# Patient Record
Sex: FEMALE | Race: WHITE
[De-identification: names, ages, dates, MRNs, and addresses within clinical notes are randomized per-mention and may not be internally consistent; named-entity substitution may affect disease eponyms.]

---

## 2021-11-13 ENCOUNTER — HOSPITAL ENCOUNTER (OUTPATIENT)
Dept: HOSPITAL 47 - EC | Age: 31
Setting detail: OBSERVATION
LOS: 2 days | Discharge: HOME | End: 2021-11-15
Attending: OBSTETRICS & GYNECOLOGY | Admitting: OBSTETRICS & GYNECOLOGY
Payer: COMMERCIAL

## 2021-11-13 DIAGNOSIS — K21.9: ICD-10-CM

## 2021-11-13 DIAGNOSIS — Z87.11: ICD-10-CM

## 2021-11-13 DIAGNOSIS — E66.01: ICD-10-CM

## 2021-11-13 DIAGNOSIS — Z98.84: ICD-10-CM

## 2021-11-13 DIAGNOSIS — R79.89: ICD-10-CM

## 2021-11-13 DIAGNOSIS — Z88.1: ICD-10-CM

## 2021-11-13 DIAGNOSIS — Z98.890: ICD-10-CM

## 2021-11-13 DIAGNOSIS — Z88.5: ICD-10-CM

## 2021-11-13 DIAGNOSIS — K66.1: ICD-10-CM

## 2021-11-13 DIAGNOSIS — O00.102: Primary | ICD-10-CM

## 2021-11-13 DIAGNOSIS — Z90.49: ICD-10-CM

## 2021-11-13 LAB
ALBUMIN SERPL-MCNC: 3.9 G/DL (ref 3.5–5)
ALP SERPL-CCNC: 50 U/L (ref 38–126)
ALT SERPL-CCNC: 33 U/L (ref 4–34)
AMYLASE SERPL-CCNC: 55 U/L (ref 30–110)
ANION GAP SERPL CALC-SCNC: 8 MMOL/L
APTT BLD: 22.5 SEC (ref 22–30)
AST SERPL-CCNC: 30 U/L (ref 14–36)
BASOPHILS # BLD AUTO: 0 K/UL (ref 0–0.2)
BASOPHILS # BLD AUTO: 0.1 K/UL (ref 0–0.2)
BASOPHILS NFR BLD AUTO: 0 %
BASOPHILS NFR BLD AUTO: 0 %
BUN SERPL-SCNC: 10 MG/DL (ref 7–17)
CALCIUM SPEC-MCNC: 9.7 MG/DL (ref 8.4–10.2)
CHLORIDE SERPL-SCNC: 104 MMOL/L (ref 98–107)
CO2 SERPL-SCNC: 24 MMOL/L (ref 22–30)
EOSINOPHIL # BLD AUTO: 0 K/UL (ref 0–0.7)
EOSINOPHIL # BLD AUTO: 0.2 K/UL (ref 0–0.7)
EOSINOPHIL NFR BLD AUTO: 0 %
EOSINOPHIL NFR BLD AUTO: 1 %
ERYTHROCYTE [DISTWIDTH] IN BLOOD BY AUTOMATED COUNT: 4.37 M/UL (ref 3.8–5.4)
ERYTHROCYTE [DISTWIDTH] IN BLOOD BY AUTOMATED COUNT: 4.71 M/UL (ref 3.8–5.4)
ERYTHROCYTE [DISTWIDTH] IN BLOOD: 13.1 % (ref 11.5–15.5)
ERYTHROCYTE [DISTWIDTH] IN BLOOD: 13.8 % (ref 11.5–15.5)
GLUCOSE SERPL-MCNC: 107 MG/DL (ref 74–99)
HCT VFR BLD AUTO: 37.5 % (ref 34–46)
HCT VFR BLD AUTO: 40.8 % (ref 34–46)
HGB BLD-MCNC: 12.6 GM/DL (ref 11.4–16)
HGB BLD-MCNC: 13.4 GM/DL (ref 11.4–16)
INR PPP: 0.9 (ref ?–1.2)
LIPASE SERPL-CCNC: 135 U/L (ref 23–300)
LYMPHOCYTES # SPEC AUTO: 2.6 K/UL (ref 1–4.8)
LYMPHOCYTES # SPEC AUTO: 2.8 K/UL (ref 1–4.8)
LYMPHOCYTES NFR SPEC AUTO: 17 %
LYMPHOCYTES NFR SPEC AUTO: 27 %
MCH RBC QN AUTO: 28.6 PG (ref 25–35)
MCH RBC QN AUTO: 28.7 PG (ref 25–35)
MCHC RBC AUTO-ENTMCNC: 33 G/DL (ref 31–37)
MCHC RBC AUTO-ENTMCNC: 33.5 G/DL (ref 31–37)
MCV RBC AUTO: 85.6 FL (ref 80–100)
MCV RBC AUTO: 86.6 FL (ref 80–100)
MONOCYTES # BLD AUTO: 0.5 K/UL (ref 0–1)
MONOCYTES # BLD AUTO: 0.5 K/UL (ref 0–1)
MONOCYTES NFR BLD AUTO: 3 %
MONOCYTES NFR BLD AUTO: 4 %
NEUTROPHILS # BLD AUTO: 11.9 K/UL (ref 1.3–7.7)
NEUTROPHILS # BLD AUTO: 7 K/UL (ref 1.3–7.7)
NEUTROPHILS NFR BLD AUTO: 66 %
NEUTROPHILS NFR BLD AUTO: 78 %
PH UR: 6.5 [PH] (ref 5–8)
PLATELET # BLD AUTO: 334 K/UL (ref 150–450)
PLATELET # BLD AUTO: 381 K/UL (ref 150–450)
POTASSIUM SERPL-SCNC: 4.2 MMOL/L (ref 3.5–5.1)
PROT SERPL-MCNC: 6.9 G/DL (ref 6.3–8.2)
PT BLD: 10 SEC (ref 9–12)
SODIUM SERPL-SCNC: 136 MMOL/L (ref 137–145)
SP GR UR: 1.03 (ref 1–1.03)
UROBILINOGEN UR QL STRIP: <2 MG/DL (ref ?–2)
WBC # BLD AUTO: 10.6 K/UL (ref 3.8–10.6)
WBC # BLD AUTO: 15.3 K/UL (ref 3.8–10.6)

## 2021-11-13 PROCEDURE — 84484 ASSAY OF TROPONIN QUANT: CPT

## 2021-11-13 PROCEDURE — 86850 RBC ANTIBODY SCREEN: CPT

## 2021-11-13 PROCEDURE — 93005 ELECTROCARDIOGRAM TRACING: CPT

## 2021-11-13 PROCEDURE — 96361 HYDRATE IV INFUSION ADD-ON: CPT

## 2021-11-13 PROCEDURE — 96376 TX/PRO/DX INJ SAME DRUG ADON: CPT

## 2021-11-13 PROCEDURE — 99285 EMERGENCY DEPT VISIT HI MDM: CPT

## 2021-11-13 PROCEDURE — 88305 TISSUE EXAM BY PATHOLOGIST: CPT

## 2021-11-13 PROCEDURE — 74018 RADEX ABDOMEN 1 VIEW: CPT

## 2021-11-13 PROCEDURE — 86900 BLOOD TYPING SEROLOGIC ABO: CPT

## 2021-11-13 PROCEDURE — 85610 PROTHROMBIN TIME: CPT

## 2021-11-13 PROCEDURE — 81025 URINE PREGNANCY TEST: CPT

## 2021-11-13 PROCEDURE — 96375 TX/PRO/DX INJ NEW DRUG ADDON: CPT

## 2021-11-13 PROCEDURE — 96374 THER/PROPH/DIAG INJ IV PUSH: CPT

## 2021-11-13 PROCEDURE — 86901 BLOOD TYPING SEROLOGIC RH(D): CPT

## 2021-11-13 PROCEDURE — 36415 COLL VENOUS BLD VENIPUNCTURE: CPT

## 2021-11-13 PROCEDURE — 76817 TRANSVAGINAL US OBSTETRIC: CPT

## 2021-11-13 PROCEDURE — 84702 CHORIONIC GONADOTROPIN TEST: CPT

## 2021-11-13 PROCEDURE — 59151 TREAT ECTOPIC PREGNANCY: CPT

## 2021-11-13 PROCEDURE — 83690 ASSAY OF LIPASE: CPT

## 2021-11-13 PROCEDURE — 85025 COMPLETE CBC W/AUTO DIFF WBC: CPT

## 2021-11-13 PROCEDURE — 85379 FIBRIN DEGRADATION QUANT: CPT

## 2021-11-13 PROCEDURE — 76801 OB US < 14 WKS SINGLE FETUS: CPT

## 2021-11-13 PROCEDURE — 80053 COMPREHEN METABOLIC PANEL: CPT

## 2021-11-13 PROCEDURE — 71045 X-RAY EXAM CHEST 1 VIEW: CPT

## 2021-11-13 PROCEDURE — 85730 THROMBOPLASTIN TIME PARTIAL: CPT

## 2021-11-13 PROCEDURE — 81003 URINALYSIS AUTO W/O SCOPE: CPT

## 2021-11-13 PROCEDURE — 82150 ASSAY OF AMYLASE: CPT

## 2021-11-13 RX ADMIN — POTASSIUM CHLORIDE SCH MLS/HR: 14.9 INJECTION, SOLUTION INTRAVENOUS at 23:53

## 2021-11-13 NOTE — US
EXAMINATION TYPE: Transabdominal and TV US

 

DATE OF EXAM: 2021 6:35 PM

 

COMPARISON: NONE

 

CLINICAL HISTORY: diffuse lower abdominal pain, sudden onset. EC patient with RUQ pain now; gallbladd
er already removed; prior abdominal pain today; did not know she was pregnant (urine detected); 

 

EXAM PERFORMED:  Transvaginal (TV) and Transabdominal (TA)

 

EXAM MEASUREMENTS:

 

GESTATIONAL AGE / DATING

 

Physician Established: Not yet established 

Dates by LMP: (2 weeks/4 days)  

** EDC: 2022

Dates by First Scan:  No previous 

Dates by Current Scan for: no IUP or ectopic is seen today and may be limited by patient's very large
 body habitus

 

MATERNAL ANATOMY 

 

Uterus: 7.4 x 5.2 x 4.1cm; endometrial thickness = 0.7cm 

Right Ovary: 2.3 x 2.7 x 2.5cm

Left Ovary: not seen TA or TV US

Post CDS / Adnexa: moderate amount of free fluid seen in posterior cul de sac =1.3 x 2.8 x 1.2 x 0.52
3 = 2.3ml. 

Presence of corpus luteal cyst: not seen

 

GESTATION / FETAL SURVEY: no IUP or ectopic pregnancy is seen; left ovary is not seen.

 

 

Date of LMP: 10/26/41394

Beta HcG (if available):  urine detected

 

 

 

 

 

IMPRESSION:

The uterus is empty. No adnexal mass. There is some free fluid in the cul-de-sac measures 1.3 cm in t
hickness. There are cervical cysts noted.

## 2021-11-13 NOTE — XR
EXAMINATION TYPE: XR chest 1V portable

 

DATE OF EXAM: 11/13/2021

 

COMPARISON: NONE

 

HISTORY: Right sided chest pain

 

TECHNIQUE: 2 views portable

 

FINDINGS: Heart and mediastinum are normal. Lungs are clear. Diaphragm is normal. Bony thorax is inta
ct.

 

IMPRESSION: Normal chest.

## 2021-11-13 NOTE — ED
General Adult HPI





<Steffen Michael - Last Filed: 11/13/21 20:18>





- General


Source: patient, RN notes reviewed


Mode of arrival: wheelchair


Limitations: no limitations





<Danna Torres - Last Filed: 11/14/21 10:10>





- General


Chief complaint: Abdominal Pain


Stated complaint: Abd pain


Time Seen by Provider: 11/13/21 13:23





- History of Present Illness


Initial comments: 





31-year-old female presents to the emergency department, accompanied by her 

spouse, for evaluation of generalized lower abdominal pain, onset noon today.  

Patient states the pain came on unprovoked and is accompanied by mild nausea.  

Reports worsening pain with movement, laughing, and any activity.  Patient 

describes discomfort as severe cramping.  States she has had a bowel movement 

today, denies diarrhea or constipation.  States she is also experiencing 

increased pressure in the lower abdomen with urination.  Patient reports LMP 

10/30/2021.  Patient denies fever, chills, headache, chest pain, difficulty 

breathing, and shortness of breath. (Danna Torres)





- Related Data


                                Home Medications











 Medication  Instructions  Recorded  Confirmed


 


No Known Home Medications  11/13/21 11/13/21











                                    Allergies











Allergy/AdvReac Type Severity Reaction Status Date / Time


 


codeine Allergy  Unknown Verified 11/13/21 14:35


 


hydromorphone [From Dilaudid] Allergy  Chest Pain Verified 11/13/21 14:35


 


nitrofurantoin Allergy  Anaphylaxis Verified 11/13/21 14:35





[From Macrobid]     














Review of Systems


ROS Other: All systems not noted in ROS Statement are negative.





<Steffen Michael - Last Filed: 11/13/21 20:18>


ROS Other: All systems not noted in ROS Statement are negative.





<Danna Torres - Last Filed: 11/14/21 10:10>


ROS Statement: 


Those systems with pertinent positive or pertinent negative responses have been 

documented in the HPI.








Past Medical History





- Past Family History


  ** Mother


Family Medical History: No Reported History





<Danna Torres - Last Filed: 11/14/21 10:10>





General Exam


Limitations: no limitations (Well-developed, well-nourished female in no acute 

distress.  Initial temperature 97.7, pulse 83, respirations 20, blood pressure 

135/93, pulse ox 100% on room air.)


General appearance: alert, in no apparent distress


ENT exam: Present: normal exam, normal oropharynx, mucous membranes moist


Respiratory exam: Present: normal lung sounds bilaterally.  Absent: respiratory 

distress, wheezes, rales, rhonchi, stridor


Cardiovascular Exam: Present: regular rate, normal rhythm, normal heart sounds. 

Absent: systolic murmur, diastolic murmur, rubs, gallop, clicks


GI/Abdominal exam: Present: soft, tenderness (Diffuse lower abdominal tenderness

unchanged with palpation), normal bowel sounds.  Absent: distended, guarding, 

rebound, rigid


Back exam: Present: normal inspection.  Absent: CVA tenderness (R), CVA tende

rness (L)


Neurological exam: Present: alert, oriented X3, CN II-XII intact


Psychiatric exam: Present: normal mood


Skin exam: Present: warm, dry, intact, normal color.  Absent: rash





<Danna Torres - Last Filed: 11/14/21 10:10>





Course





<Danna Torres - Last Filed: 11/14/21 10:10>


                                   Vital Signs











  11/13/21 11/13/21 11/13/21





  12:24 17:16 21:48


 


Temperature 97.7 F  


 


Pulse Rate 83 95 90


 


Respiratory 20 18 18





Rate   


 


Blood Pressure 135/93 108/78 


 


O2 Sat by Pulse 100 98 98





Oximetry   














- Reevaluation(s)


Reevaluation #1: 





11/13/21 16:47


Entered patient's room to update her on positive pregnancy, and found her dry 

heaving.  Informed her that the CT would be canceled and that additional blood 

work would be required.  Zofran ordered for nausea.  Patient agreeable to plan 

of care.


Patient states last menstrual period was 3 weeks ago and was one week late.  

Denies any vaginal bleeding or discharge currently.


11/13/21 19:30


Findings discussed with Dr. Michael who will assume patient care at this time.


 (Danna Torres)





Medical Decision Making





- Lab Data


Result diagrams: 


                                 11/13/21 12:31





                                 11/13/21 12:31





<Steffen Michael - Last Filed: 11/13/21 20:18>





- Lab Data


Result diagrams: 


                                 11/14/21 05:52





                                 11/13/21 12:31





<Danna Torres - Last Filed: 11/14/21 10:10>





- Medical Decision Making


31-year-old female with abdominal pain.  Pain initially began as bilateral lower

abdominal pain.  Patient initially denied chance of pregnancy stating her last 

menstrual cycle was approximately 2 weeks ago.  Patient was found to be pregnant

with a beta hCG of 3100.  Ultrasound was performed which was performed both 

transabdominally and transvaginally.  The left ovary was not visualized.  There 

was some free fluid within the pelvis.  Initial hemoglobin was 13.4.  The vital 

signs are stable.  At the time of my initial evaluation patient had no abdominal

tenderness but did complain of pain in the right upper quadrant.  Stating that 

her pelvic pain and lower abdominal pain had resolved.  I did discuss case with 

Dr. Pancho peacock for OB/GYN, she agrees to admit the patient for observation.  

Repeat hemoglobin will be obtained now and at 6 AM as well as a repeat beta hCG 

in the morning.  Pain medication will be available.  The patient will be kept 

nothing by mouth. (Steffen Michael)





31-year-old female presents to the emergency Department with complaints of 

sudden onset lower abdominal pain around noon today.  Upon exam, patient is 

well-appearing, but reports persistent discomfort and intermittent nausea.  

Denied vaginal bleeding or chance of pregnancy stating her LMP 10/30/2021. 

However, upon review of lab work, patient's urine hCG is positive therefore a 

beta was ordered and hCG is 3154.  Ultrasound was obtained and shows no 

intrauterine pregnancy or ectopic, however there is fluid noted in the 

cul-de-sac.  Patient's pain has localized to underneath the right breast/ right 

upper quadrant and abdomen remains nontender to palpation.  Pain and nausea 

medication were given with minimal improvement therefore doses were repeated. 

Patient care was discussed with Dr. Michael who agrees to assume care of this 

patient at this time. (Danna Torres)





- Lab Data


                                   Lab Results











  11/13/21 11/13/21 11/13/21 Range/Units





  12:31 12:31 12:31 


 


WBC  10.6    (3.8-10.6)  k/uL


 


RBC  4.71    (3.80-5.40)  m/uL


 


Hgb  13.4    (11.4-16.0)  gm/dL


 


Hct  40.8    (34.0-46.0)  %


 


MCV  86.6    (80.0-100.0)  fL


 


MCH  28.6    (25.0-35.0)  pg


 


MCHC  33.0    (31.0-37.0)  g/dL


 


RDW  13.1    (11.5-15.5)  %


 


Plt Count  334    (150-450)  k/uL


 


MPV  7.2    


 


Neutrophils %  66    %


 


Lymphocytes %  27    %


 


Monocytes %  4    %


 


Eosinophils %  1    %


 


Basophils %  0    %


 


Neutrophils #  7.0    (1.3-7.7)  k/uL


 


Lymphocytes #  2.8    (1.0-4.8)  k/uL


 


Monocytes #  0.5    (0-1.0)  k/uL


 


Eosinophils #  0.2    (0-0.7)  k/uL


 


Basophils #  0.0    (0-0.2)  k/uL


 


PT     (9.0-12.0)  sec


 


INR     (<1.2)  


 


APTT     (22.0-30.0)  sec


 


Sodium    136 L  (137-145)  mmol/L


 


Potassium    4.2  (3.5-5.1)  mmol/L


 


Chloride    104  ()  mmol/L


 


Carbon Dioxide    24  (22-30)  mmol/L


 


Anion Gap    8  mmol/L


 


BUN    10  (7-17)  mg/dL


 


Creatinine    0.81  (0.52-1.04)  mg/dL


 


Est GFR (CKD-EPI)AfAm    >90  (>60 ml/min/1.73 sqM)  


 


Est GFR (CKD-EPI)NonAf    >90  (>60 ml/min/1.73 sqM)  


 


Glucose    107 H  (74-99)  mg/dL


 


Calcium    9.7  (8.4-10.2)  mg/dL


 


Total Bilirubin    0.3  (0.2-1.3)  mg/dL


 


AST    30  (14-36)  U/L


 


ALT    33  (4-34)  U/L


 


Alkaline Phosphatase    50  ()  U/L


 


Total Protein    6.9  (6.3-8.2)  g/dL


 


Albumin    3.9  (3.5-5.0)  g/dL


 


Amylase    55  ()  U/L


 


Lipase    135  ()  U/L


 


HCG, Quant     mIU/mL


 


Urine Color   Yellow   


 


Urine Appearance   Clear   (Clear)  


 


Urine pH   6.5   (5.0-8.0)  


 


Ur Specific Gravity   1.026   (1.001-1.035)  


 


Urine Protein   Negative   (Negative)  


 


Urine Glucose (UA)   Negative   (Negative)  


 


Urine Ketones   Negative   (Negative)  


 


Urine Blood   Negative   (Negative)  


 


Urine Nitrite   Negative   (Negative)  


 


Urine Bilirubin   Negative   (Negative)  


 


Urine Urobilinogen   <2.0   (<2.0)  mg/dL


 


Ur Leukocyte Esterase   Negative   (Negative)  


 


Urine HCG, Qual     (Not Detectd)  


 


Blood Type     


 


Blood Type Confirm     


 


Blood Type Recheck     


 


Bld Type Recheck Status     


 


Antibody Screen     


 


Spec Expiration Date     














  11/13/21 11/13/21 11/13/21 Range/Units





  12:31 12:31 20:02 


 


WBC     (3.8-10.6)  k/uL


 


RBC     (3.80-5.40)  m/uL


 


Hgb     (11.4-16.0)  gm/dL


 


Hct     (34.0-46.0)  %


 


MCV     (80.0-100.0)  fL


 


MCH     (25.0-35.0)  pg


 


MCHC     (31.0-37.0)  g/dL


 


RDW     (11.5-15.5)  %


 


Plt Count     (150-450)  k/uL


 


MPV     


 


Neutrophils %     %


 


Lymphocytes %     %


 


Monocytes %     %


 


Eosinophils %     %


 


Basophils %     %


 


Neutrophils #     (1.3-7.7)  k/uL


 


Lymphocytes #     (1.0-4.8)  k/uL


 


Monocytes #     (0-1.0)  k/uL


 


Eosinophils #     (0-0.7)  k/uL


 


Basophils #     (0-0.2)  k/uL


 


PT     (9.0-12.0)  sec


 


INR     (<1.2)  


 


APTT     (22.0-30.0)  sec


 


Sodium     (137-145)  mmol/L


 


Potassium     (3.5-5.1)  mmol/L


 


Chloride     ()  mmol/L


 


Carbon Dioxide     (22-30)  mmol/L


 


Anion Gap     mmol/L


 


BUN     (7-17)  mg/dL


 


Creatinine     (0.52-1.04)  mg/dL


 


Est GFR (CKD-EPI)AfAm     (>60 ml/min/1.73 sqM)  


 


Est GFR (CKD-EPI)NonAf     (>60 ml/min/1.73 sqM)  


 


Glucose     (74-99)  mg/dL


 


Calcium     (8.4-10.2)  mg/dL


 


Total Bilirubin     (0.2-1.3)  mg/dL


 


AST     (14-36)  U/L


 


ALT     (4-34)  U/L


 


Alkaline Phosphatase     ()  U/L


 


Total Protein     (6.3-8.2)  g/dL


 


Albumin     (3.5-5.0)  g/dL


 


Amylase     ()  U/L


 


Lipase     ()  U/L


 


HCG, Quant   3164.3   mIU/mL


 


Urine Color     


 


Urine Appearance     (Clear)  


 


Urine pH     (5.0-8.0)  


 


Ur Specific Gravity     (1.001-1.035)  


 


Urine Protein     (Negative)  


 


Urine Glucose (UA)     (Negative)  


 


Urine Ketones     (Negative)  


 


Urine Blood     (Negative)  


 


Urine Nitrite     (Negative)  


 


Urine Bilirubin     (Negative)  


 


Urine Urobilinogen     (<2.0)  mg/dL


 


Ur Leukocyte Esterase     (Negative)  


 


Urine HCG, Qual  Detected    (Not Detectd)  


 


Blood Type     


 


Blood Type Confirm    B Positive  


 


Blood Type Recheck     


 


Bld Type Recheck Status     


 


Antibody Screen     


 


Spec Expiration Date     














  11/13/21 11/13/21 11/13/21 Range/Units





  20:11 20:11 20:11 


 


WBC    15.3 H  (3.8-10.6)  k/uL


 


RBC    4.37  (3.80-5.40)  m/uL


 


Hgb    12.6  (11.4-16.0)  gm/dL


 


Hct    37.5  (34.0-46.0)  %


 


MCV    85.6  (80.0-100.0)  fL


 


MCH    28.7  (25.0-35.0)  pg


 


MCHC    33.5  (31.0-37.0)  g/dL


 


RDW    13.8  (11.5-15.5)  %


 


Plt Count    381  (150-450)  k/uL


 


MPV    7.2  


 


Neutrophils %    78  %


 


Lymphocytes %    17  %


 


Monocytes %    3  %


 


Eosinophils %    0  %


 


Basophils %    0  %


 


Neutrophils #    11.9 H  (1.3-7.7)  k/uL


 


Lymphocytes #    2.6  (1.0-4.8)  k/uL


 


Monocytes #    0.5  (0-1.0)  k/uL


 


Eosinophils #    0.0  (0-0.7)  k/uL


 


Basophils #    0.1  (0-0.2)  k/uL


 


PT   10.0   (9.0-12.0)  sec


 


INR   0.9   (<1.2)  


 


APTT   22.5   (22.0-30.0)  sec


 


Sodium     (137-145)  mmol/L


 


Potassium     (3.5-5.1)  mmol/L


 


Chloride     ()  mmol/L


 


Carbon Dioxide     (22-30)  mmol/L


 


Anion Gap     mmol/L


 


BUN     (7-17)  mg/dL


 


Creatinine     (0.52-1.04)  mg/dL


 


Est GFR (CKD-EPI)AfAm     (>60 ml/min/1.73 sqM)  


 


Est GFR (CKD-EPI)NonAf     (>60 ml/min/1.73 sqM)  


 


Glucose     (74-99)  mg/dL


 


Calcium     (8.4-10.2)  mg/dL


 


Total Bilirubin     (0.2-1.3)  mg/dL


 


AST     (14-36)  U/L


 


ALT     (4-34)  U/L


 


Alkaline Phosphatase     ()  U/L


 


Total Protein     (6.3-8.2)  g/dL


 


Albumin     (3.5-5.0)  g/dL


 


Amylase     ()  U/L


 


Lipase     ()  U/L


 


HCG, Quant     mIU/mL


 


Urine Color     


 


Urine Appearance     (Clear)  


 


Urine pH     (5.0-8.0)  


 


Ur Specific Gravity     (1.001-1.035)  


 


Urine Protein     (Negative)  


 


Urine Glucose (UA)     (Negative)  


 


Urine Ketones     (Negative)  


 


Urine Blood     (Negative)  


 


Urine Nitrite     (Negative)  


 


Urine Bilirubin     (Negative)  


 


Urine Urobilinogen     (<2.0)  mg/dL


 


Ur Leukocyte Esterase     (Negative)  


 


Urine HCG, Qual     (Not Detectd)  


 


Blood Type  B Positive    


 


Blood Type Confirm     


 


Blood Type Recheck  No Previous Record    


 


Bld Type Recheck Status  CABO Indicated    


 


Antibody Screen  NEGATIVE    


 


Spec Expiration Date  11/16/2021 - 2311    














Disposition


Is patient prescribed a controlled substance at d/c from ED?: No


Decision to Admit Reason: Admit from EC


Decision Date: 11/13/21


Decision Time: 20:21





<Steffen Michael - Last Filed: 11/13/21 20:18>


Is patient prescribed a controlled substance at d/c from ED?: No





<Danna Torres - Last Filed: 11/14/21 10:10>


Clinical Impression: 


 Abdominal pain, Pregnancy





Disposition: ADMITTED [facility] IP TO [facility] [place]


Condition: Stable

## 2021-11-13 NOTE — XR
EXAMINATION TYPE: XR KUB

 

DATE OF EXAM: 11/13/2021

 

COMPARISON: None

 

INDICATION: Abdomen pain

 

TECHNIQUE: Single view abdomen upright view

 

FINDINGS:  

No free air is under the diaphragm. Normal colonic bowel gas is present. No suspicious air-fluid leve
ls or differential air-fluid levels are evident.

Psoas margins are normal.

No organomegaly is present.

No suspicious calcifications evident.

 

IMPRESSION: 

1. Unremarkable Abdomen

## 2021-11-13 NOTE — P.CONS
History of Present Illness





- Reason for Consult


Consult date: 11/13/21





- History of Present Illness


The patient is a 31-year-old female with a PMH of cholecystectomy, gastric 

sleeve and subsequent gastric ulcer in 2013 who presented to the emergency room 

with complaints of lower abdominal pressure and pain.  The patient reported that

her pain had started earlier today, unprovoked, and gradually worsened.  She 

initially felt as though she had to defecate, but after spending some time on 

the toilet, she was unable to do so.  She experienced continued pressure of the 

lower abdomen, which prompted her to come to the emergency room.  In the 

emergency room, the patient's pregnancy test returned positive with a hCG 3164. 

The patient then noted that her pain subsequently changed to right upper 

quadrant, right flank, and right shoulder.  She notes that the pain is very 

strongly pleuritic, 10 out of 10, worsening with movement, and occurring in a 

bandlike fashion across her right flank to her right shoulder, and her right 

upper quadrant.  The pain is alleviated with taking shallow breaths or with 

sitting in a specific position.  She denied ever experiencing such pain in the 

past.  Also denied any history of blood clots.  Denied lower extremity swelling,

pain, or redness.  Denied any history of heart disease.  Reports occasional 

cramping of her calves and her back, but states that she hasn't experienced one 

"in quite some time".  The patient further denied dizziness, nausea, vomiting, 

diaphoresis.  Vital signs at time of the interview were bp 109/70, pulse 62, and

SpO2 97% on room air.  EKG revealed a normal sinus rhythm at 77 bpm with no 

ST/T-wave changes noted as reviewed by me.





Review of systems:


Pertinent positives and negatives as discussed in HPI, a complete review of 

systems was performed and all other systems are negative.





Physical examination:


General: non toxic, in moderate distress, appears older than stated age, 

morbidly obese


Derm: no unusual rashes/lesions no unusual ecchymoses, warm, dry


Head: atraumatic, normocephalic, symmetric


Eyes: EOMI, no lid lag, anicteric sclera, pupils equal round reactive to light


ENT: Nose and ears atraumatic, no thrush,  no pharyngeal erythema


Neck: No thyromegaly, no cervical lymphadenopathy, trachea midline, supple


Mouth: no lip lesion, mucus membranes moist


Cardiovascular: S1S2 reg, no murmur, positive posterior tibial pulse bilateral, 

no edema, capillary refill less than 2 seconds


Lungs: CTA bilateral, no rhonchi, no rales , no accessory muscle use, right 

chest wall tenderness


Abdominal: soft, right upper quadrant tenderness, some guarding, no appreciable 

organomegaly, normal bowel sounds


Ext: no gross muscle atrophy,  muscle strength 5 out of 5 in all 4 extremities 

grossly, no contractures, 


Neuro: No gross focal deficits noted


Psych: Alert, oriented, appropriate affect 





Assessment/plan





Right upper quadrant, right flank, and right shoulder pain


-Troponin and d-dimer ordered by cardiology


-Low suspicion for ACS at this time due to strong pleuritic nature of pain


-Also low suspicion for PE in setting of normal SpO2 and pulse rate.  Follow-up 

d-dimer, although likely to be elevated due to ongoing pregnancy


-Defer to OB/GYN with regards to possible ectopic pregnancy


-Discussed the case with OB/GYN physician on call.  Flexeril ordered for 

suspected muscle spasm.  OB/GYN ordered Toradol for the pain





We appreciate this opportunity to be involved in this patient's care. We will 

follow the patient with you. For any further questions, please not hesitate to 

contact the [facility] inpatient team.





Medications and Allergies


                                Home Medications











 Medication  Instructions  Recorded  Confirmed  Type


 


No Known Home Medications  11/13/21 11/13/21 History








                                    Allergies











Allergy/AdvReac Type Severity Reaction Status Date / Time


 


codeine Allergy  Unknown Verified 11/13/21 14:35


 


hydromorphone [From Dilaudid] Allergy  Chest Pain Verified 11/13/21 14:35


 


nitrofurantoin Allergy  Anaphylaxis Verified 11/13/21 14:35





[From Macrobid]     














Physical Exam


Vitals: 


                                   Vital Signs











  Temp Pulse Pulse Resp BP BP Pulse Ox


 


 11/13/21 23:30    62  18   109/70  97


 


 11/13/21 22:10  98 F   87  18   135/83  98


 


 11/13/21 21:48   90   18    98


 


 11/13/21 17:16   95   18  108/78   98


 


 11/13/21 12:24  97.7 F  83   20  135/93   100








                                Intake and Output











 11/13/21 11/13/21 11/14/21





 14:59 22:59 06:59


 


Other:   


 


  # Voids  1 


 


  Weight 181.437 kg  














Results


CBC & Chem 7: 


                                 11/13/21 20:11





                                 11/13/21 12:31


Labs: 


                  Abnormal Lab Results - Last 24 Hours (Table)











  11/13/21 11/13/21 Range/Units





  12:31 20:11 


 


WBC   15.3 H  (3.8-10.6)  k/uL


 


Neutrophils #   11.9 H  (1.3-7.7)  k/uL


 


Sodium  136 L   (137-145)  mmol/L


 


Glucose  107 H   (74-99)  mg/dL

## 2021-11-14 LAB
BASOPHILS # BLD AUTO: 0 K/UL (ref 0–0.2)
BASOPHILS NFR BLD AUTO: 0 %
EOSINOPHIL # BLD AUTO: 0 K/UL (ref 0–0.7)
EOSINOPHIL NFR BLD AUTO: 0 %
ERYTHROCYTE [DISTWIDTH] IN BLOOD BY AUTOMATED COUNT: 3.67 M/UL (ref 3.8–5.4)
ERYTHROCYTE [DISTWIDTH] IN BLOOD: 13.4 % (ref 11.5–15.5)
HCT VFR BLD AUTO: 32.3 % (ref 34–46)
HGB BLD-MCNC: 10.5 GM/DL (ref 11.4–16)
LYMPHOCYTES # SPEC AUTO: 2.6 K/UL (ref 1–4.8)
LYMPHOCYTES NFR SPEC AUTO: 25 %
MCH RBC QN AUTO: 28.7 PG (ref 25–35)
MCHC RBC AUTO-ENTMCNC: 32.5 G/DL (ref 31–37)
MCV RBC AUTO: 88 FL (ref 80–100)
MONOCYTES # BLD AUTO: 0.5 K/UL (ref 0–1)
MONOCYTES NFR BLD AUTO: 5 %
NEUTROPHILS # BLD AUTO: 7.3 K/UL (ref 1.3–7.7)
NEUTROPHILS NFR BLD AUTO: 68 %
PLATELET # BLD AUTO: 285 K/UL (ref 150–450)
WBC # BLD AUTO: 10.6 K/UL (ref 3.8–10.6)

## 2021-11-14 RX ADMIN — POTASSIUM CHLORIDE SCH MLS/HR: 14.9 INJECTION, SOLUTION INTRAVENOUS at 08:05

## 2021-11-14 RX ADMIN — POTASSIUM CHLORIDE SCH MLS: 14.9 INJECTION, SOLUTION INTRAVENOUS at 12:35

## 2021-11-14 NOTE — P.PN
Progress Note - Text


Progress Note Date: 11/14/21





Patient is seen again this morning.  She states she got up to the bathroom and 

feels like she has to urinate but has occult to going.  She feels like she has a

constant pressure down there.  She slept pretty well but once she got up this 

morning she has been more uncomfortable in her pelvis region on both sides more 

on the left this morning.  Her beta hCG did drop to 28,000 from 31,000 her 

hemoglobin also dropped from 12.6 last night to 10.5 this morning.  In light of 

these findings and ultrasound was again performed this morning.  She had 

cul-de-sac fluid of 1.3 cm yesterday and now at 7.1 cm.  Still no definite 

ectopic is seen and spontaneous miscarriage versus ectopic pregnancy is con

sidered according to radiology.  Abdomen is soft with still tenderness in the 

right mid abdomen and diffuse tenderness in her lower abdomen.  She rates her 

pain at a 4-5 right now.  Impression is probable ruptured ectopic pregnancy.  

Plan is to proceed with laparoscopy with possible laparotomy, possible left 

inject me, possible salpingectomy, possible oophorotomy, and possible D&C.  

Risks and benefits of the surgery were discussed in detail with the patient.  I 

also consulted with Dr. Anoop Hernandez from general surgery who will be present in 

surgery to help with trocar placement due to her history of a gastric sleeve.  

Consents are signed.

## 2021-11-14 NOTE — P.DS
Providers


Date of admission: 


21 20:21





Expected date of discharge: 21


Attending physician: 


Alma Deleon





Consults: 





                                        





21 23:00


Consult Physician Stat 


   Consulting Provider: [facility]


   Consult Reason/Comments: chest pain


   Do you want consulting provider notified?: Already Contacted











Primary care physician: 


Jose M Fernandez





Hospital Course: 





This is a 31-year-old female  2 para 1 who presented with complaints of 

abdominal pain that started suddenly yesterday at around noon.  The pain did 

resolve with pain medication initially and she was admitted for observation and 

serial beta hCG and CBC levels.  Her beta hCG level did drop after 12 hours and 

her CBC did show a 2 g drop within 8 hours.  In addition her pain increased the 

morning and her for the decision was made to proceed with surgery.  She 

underwent a laparoscopy with evacuation of hemoperitoneum and removal of left 

fallopian tube and ectopic pregnancy due to ruptured ectopic.  Postoperatively 

she will go to the floor and then is long and she is feeling well and tolerating

diet, will be discharged home later today.  She will be given a prescription for

ibuprofen and Norco.  She is advised no intercourse and no heavy lifting.  She 

will need to see me in the office in approximately 1 week for a postoperative 

check.  Routine postoperative instructions are given.  She is advised to call 

the office if she has any further questions or concerns after discharge.


Procedures: 





Laparoscopy with evacuation of hemoperitoneum, left salpingectomy, and removal 

of ectopic pregnancy on 2021


Patient Condition at Discharge: Stable





Plan - Discharge Summary


New Discharge Prescriptions: 


New


   HYDROcodone/APAP 7.5-325MG [Norco 7.5-325] 1 each PO Q6HR PRN #12 tab


     PRN Reason: Pain


   Ibuprofen [Motrin] 600 mg PO Q6HR PRN #60 tab


     PRN Reason: Mild Discomfort


Discharge Medication List





HYDROcodone/APAP 7.5-325MG [Norco 7.5-325] 1 each PO Q6HR PRN #12 tab 21 

[Rx]


Ibuprofen [Motrin] 600 mg PO Q6HR PRN #60 tab 21 [Rx]








Follow up Appointment(s)/Referral(s): 


Jose M Fernandez DO [Primary Care Provider] - 1-2 days


Alma Deleon DO [Doctor of Osteopathic Medicine] - 1 Week


Activity/Diet/Wound Care/Special Instructions: 


Activity as tolerated.  May shower, but no tub baths for 1 week.  No intercourse

for at least 1 week.  No driving while on narcotic pain medication.  May use 

ibuprofen or Norco as needed for pain.  Call the office if there are any 

concerns or questions prior to your appointment time.


Discharge Disposition: HOME SELF-CARE

## 2021-11-14 NOTE — P.OP
Date of Procedure: 11/14/21


Procedure(s) Performed: 


PREOPERATIVE DIAGNOSIS: Hemoperitoneum


POSTOPERATIVE DIAGNOSIS: Ruptured ectopic


PROCEDURE: Diagnostic laparoscopy, assist with gynecologic procedure


SURGEON: Mary


EBL: Refer to GYN off no


ANESTHESIA: General


COMPLICATIONS: None


OPERATIVE PROCEDURE: Patient place in the operating table in the supine 

position.  General anesthesia achieved.  Patient was then placed in lithotomy.  

Abdomen was prepped and draped sterilely.  After uterine manipulator placed by 

gynecology a 5 mm left upper quadrant incision was made.  The optical 5 mm 

trocar was placed into the peritoneal cavity without difficulty.  Blood was seen

in the abdomen.  Sufflation took place to 15 mmHg.  Additional 5 mm right lower 

quadrant and 12 mm left lower quadrant trochars were placed.  I then assisted 

Dr. Deleon with the gynecologic procedure.  Refer to her operative report for the 

remainder of the details on this case.

## 2021-11-14 NOTE — P.OP
Date of Procedure: 21


Preoperative Diagnosis: 


Probable ruptured ectopic pregnancy


Postoperative Diagnosis: 


Ruptured left tubal ectopic pregnancy


Procedure(s) Performed: 


Laparoscopy with evacuation of hemoperitoneum and left salpingectomy with 

removal of ectopic pregnancy


Anesthesia: BRADEN


Surgeon: Alma Deleon


Assistant #1: Edouard Hernandez


Estimated Blood Loss (ml): 50


Pathology: other (Left fallopian tube with clot and tissue from the cul-de-sac)


Condition: stable


Disposition: floor


Indications for Procedure: 


This is a 31-year-old female  2 para 1 who presented to the emergency 

room with acute onset of lower abdominal pain.  This did initially resolve after

pain medication and ultrasound showed a very small collection of fluid behind 

uterus at that time.  She was observed overnight her beta hCG did drop from 

9864-0456.  Her pain initially resolved and her lower abdomen but then increased

this morning.  Repeat ultrasound now showed a larger fluid collection in the 

cul-de-sac up to 7 cm.  Blood type is B+.  She is taken to surgery for probable 

ruptured ectopic pregnancy.





I have discussed the risks, benefits, and alternative therapies for the above-

mentioned procedure and for both sedation/anesthesia as well as necessary blood 

products administration, if indicated, as they pertain to this patient.  The 

patient has indicated her understanding and acceptance of the risks and 

procedures discussed.





Operative Findings: 


A large amount of blood and clot is noted in the pelvis and along the gutters 

and the upper abdomen.  There is a defect in the left fallopian tube and the 

proximal portion very close to the corneal region.  There is also tissue that 

appears to be the ectopic pregnancy adherent to some clot sitting in the 

cul-de-sac.  The right fallopian tube appears very long but normal in 

appearance.  Both ovaries appear normal.


Description of Procedure: 


The patient is taken to the operating room where she is placed in the dorsal 

lithotomy position.  She is prepped and draped in the normal sterile fashion 

including a Yi catheter insertion.  Pelvic exam is performed under anesthesia

but was very difficult due to patient's size.  A bivalve speculum was placed in 

the patient's vagina and a kroner uterine inserted through the cervix and the 

balloon is inflated.  The anterior lip of the cervix had been grasped with an 

Allis clamp for retraction.  The Allis clamp and the speculum I then removed and

gloves are changed.  Attention is turned to the abdomen.  Dr. Boutt made a cut 

in the left upper abdomen with a scalpel and placed a 5 mm optical trocar under 

direct visualization.  Once inside pneumoperitoneum was achieved.  A second port

was placed in the right lower quadrant with a small cut with the scalpel and 

then a 5 mm disposable blade this trocar was placed.  A 12 mm port is then also 

placed in the left lower quadrant of the abdomen after making a small cut and 

then under direct placement the port was placed with no difficulty.  A suction 

 was used to suction the clot and blood out of the pelvis.  Once the 

uterus was able to be visualized it was apparent that there was a rupture in the

fallopian tube on the left side near the corneal region but not at the corneal 

region.  Next a LigaSure was used to clamp the fallopian tube at the corneal 

region cauterize and cut.  The remaining portion of the tube was removed 

completely using the LigaSure clamping and cauterizing and cutting.  Once the 

tube was freed it was placed above the uterus for easy location later.  Also 

there was a tissue-like structure that appeared to be the ectopic pregnancy that

was attached with clot in the posterior cul-de-sac that was also placed with the

tube.  The remaining blood clot was suctioned out.  Next a Endosac was placed 

through the 12 mm port and the ectopic pregnancy and tube were placed in the 

sac.  The sac was then closed and brought up to the trocar.  The cul-de-sac was 

irrigated and the area where the tube was removed was irrigated.  Monopolar 

cautery was used using a flat paddle on the uterus portion and excellent 

hemostasis was noted.  Once adequate hemostasis was assured the pneumoperitoneum

was released and the left lower port with a 12 mm trocar was removed including 

the fact.  The left lower quadrant port was then closed with 0 Vicryl suture in 

interrupted figure-of-eight stitch on the fascial layer and then all incisions 

are closed with 4-0 undyed Vicryl suture in a subcuticular fashion.  The 

incision sites are then injected with quarter percent Marcaine.  Approximately 

10 mL were used.  Steri-Strips and Band-Aids were then placed.  All sponge and 

needle counts are correct.  The patient is then taken to recovery room in stable

condition.

## 2021-11-14 NOTE — US
EXAMINATION TYPE: Transabdominal

 

DATE OF EXAM: 2021 8:41 AM

 

COMPARISON: US 2021

 

CLINICAL HISTORY: decreased HCG with lower abd pain.. Left side pain per patient

 

EXAM PERFORMED:  Transvaginal (TV) and Transabdominal (TA)

 

EXAM MEASUREMENTS:

 

GESTATIONAL AGE / DATING

 

Physician Established: Not yet established 

Dates by LMP: (2 weeks/5 days)  

** EDC: 2022

Dates by Current Scan for: No IUP seen at this time 

 

MATERNAL ANATOMY 

 

Uterus: 9.1 x 6.4 x 4.6 cm. Nabothian cysts are present.

Right Ovary: Obscured by overlying bowel gas

Left Ovary: 4.7 x 2.8 x 4.1 cm

Post CDS / Adnexa: No free fluid.  Hypoechoic lesion seen in cul de sac/left adnexa = 7.1 x 2.8 x 2.9
 cm 

Presence of free fluid: no

Presence of corpus luteal cyst: not visualized on todays exam

 

GESTATION / FETAL SURVEY

 

IUP:  No IUP seen at this time

 

Date of LMP: 10/26/201, 

Beta HcG (if available):  21- 2834.8

 

Endometrium = 1.2 cm.  Hyperechoic area seen adjacent to fundus. This is nonspecific. Ectopic pregnan
cy is not excluded. Due to limiting factors during this examination, artifact would be within the dif
ferential.

 

 

 

IMPRESSION:

No intrauterine gestation identified. Spontaneous  and ectopic pregnancy should be considered
. Correlation with beta-hCG is recommended. Exam is limited due to patient body habitus.

2. Ill-defined heterogenous hypoechoic area within the left adnexa adjacent to left ovary. Consider o
ther etiologies such as ectopic pregnancy, salpingitis, bowel, artifact. Findings are nonspecific.

## 2021-11-14 NOTE — P.PN
Progress Note - Text


Progress Note Date: 11/14/21





Presented rounding on patient but she just left the floor to the operative room 

for possible exploratory laparoscopy.  Patient will be seen later

## 2021-11-15 VITALS
DIASTOLIC BLOOD PRESSURE: 75 MMHG | HEART RATE: 95 BPM | SYSTOLIC BLOOD PRESSURE: 115 MMHG | TEMPERATURE: 98.1 F | RESPIRATION RATE: 16 BRPM

## 2021-11-15 LAB
BASOPHILS # BLD AUTO: 0 K/UL (ref 0–0.2)
BASOPHILS NFR BLD AUTO: 0 %
EOSINOPHIL # BLD AUTO: 0 K/UL (ref 0–0.7)
EOSINOPHIL NFR BLD AUTO: 0 %
ERYTHROCYTE [DISTWIDTH] IN BLOOD BY AUTOMATED COUNT: 2.84 M/UL (ref 3.8–5.4)
ERYTHROCYTE [DISTWIDTH] IN BLOOD: 13.8 % (ref 11.5–15.5)
HCT VFR BLD AUTO: 24.3 % (ref 34–46)
HGB BLD-MCNC: 8.3 GM/DL (ref 11.4–16)
LYMPHOCYTES # SPEC AUTO: 2.5 K/UL (ref 1–4.8)
LYMPHOCYTES NFR SPEC AUTO: 22 %
MCH RBC QN AUTO: 29.1 PG (ref 25–35)
MCHC RBC AUTO-ENTMCNC: 34 G/DL (ref 31–37)
MCV RBC AUTO: 85.6 FL (ref 80–100)
MONOCYTES # BLD AUTO: 0.5 K/UL (ref 0–1)
MONOCYTES NFR BLD AUTO: 4 %
NEUTROPHILS # BLD AUTO: 8.3 K/UL (ref 1.3–7.7)
NEUTROPHILS NFR BLD AUTO: 72 %
PLATELET # BLD AUTO: 264 K/UL (ref 150–450)
WBC # BLD AUTO: 11.5 K/UL (ref 3.8–10.6)

## 2021-11-15 RX ADMIN — POTASSIUM CHLORIDE SCH: 14.9 INJECTION, SOLUTION INTRAVENOUS at 00:52

## 2022-01-12 ENCOUNTER — HOSPITAL ENCOUNTER (OUTPATIENT)
Dept: HOSPITAL 47 - RADUSWWP | Age: 32
Discharge: HOME | End: 2022-01-12
Attending: OBSTETRICS & GYNECOLOGY
Payer: COMMERCIAL

## 2022-01-12 DIAGNOSIS — Z36.89: Primary | ICD-10-CM

## 2022-01-12 PROCEDURE — 76817 TRANSVAGINAL US OBSTETRIC: CPT

## 2022-01-12 PROCEDURE — 76801 OB US < 14 WKS SINGLE FETUS: CPT

## 2022-01-13 NOTE — US
EXAMINATION TYPE: Ultrasound OB <= 14 weeks transvaginal

 

DATE OF EXAM: 1/12/2022 4:32 PM

 

COMPARISON: NONE

 

CLINICAL HISTORY: 31-year-old female Z36.89 CONFIRM DATES AND VIABILITY. Hx of ectopic pregnancy [place]
antwan 2021; patient states having left fallopian tube removed.

 

EXAM PERFORMED:  Transvaginal (TV) and Transabdominal (TA)

 

FINDINGS:

 

EXAM MEASUREMENTS:

 

GESTATIONAL AGE / DATING

 

Physician Established: (8 weeks/1 days) 

** EDC:  8/23/22

Dates by First Scan:  No previous this is first scan here 

Dates by Current Scan for: (8 weeks/1 days)  

** EDC: 8/23/22

 

MATERNAL ANATOMY 

 

Uterus: Cervical nabothian cysts measuring up to 1.2 cm. Anteverted and gravid uterus.

Right Ovary: Not seen; excessive bowel gas

Left Ovary: Not seen; excessive bowel gas

Presence of free fluid: No

Presence of corpus luteal cyst: Ovaries not seen

Presence of subchorionic bleed: No

 

GESTATION / FETAL SURVEY

 

CRL: 16.71 mm (8 weeks/1 days)

MSD: Shows no gross abnormality.

Yolk Sac (normal less than 6mm): 0.25 cm

Heart Rate: 172 bpm, upper limits of normal

Rhythm:  Normal

IUP:  Viable IUP

 

 

Sonographer notes:** Difficult exam due to large patient body habitus **

 

 

 

 

 

IMPRESSION:

 

1. Single live intrauterine pregnancy with position and establish gestational age of 8 weeks 1 day. C
urrent ultrasound biometry by crown-rump length is exactly concordant.

2. Fetal heart rate upper limits of normal at 172 BPM. Consider short interval follow-up.

3. Neither ovary could be visualized.

## 2022-02-23 ENCOUNTER — HOSPITAL ENCOUNTER (EMERGENCY)
Dept: HOSPITAL 47 - EC | Age: 32
Discharge: HOME | End: 2022-02-23
Payer: COMMERCIAL

## 2022-02-23 VITALS
SYSTOLIC BLOOD PRESSURE: 150 MMHG | TEMPERATURE: 98.3 F | HEART RATE: 74 BPM | RESPIRATION RATE: 19 BRPM | DIASTOLIC BLOOD PRESSURE: 76 MMHG

## 2022-02-23 DIAGNOSIS — Z88.6: ICD-10-CM

## 2022-02-23 DIAGNOSIS — Z88.5: ICD-10-CM

## 2022-02-23 DIAGNOSIS — Z3A.14: ICD-10-CM

## 2022-02-23 DIAGNOSIS — Z88.3: ICD-10-CM

## 2022-02-23 DIAGNOSIS — O20.0: Primary | ICD-10-CM

## 2022-02-23 LAB
ALBUMIN SERPL-MCNC: 3.5 G/DL (ref 3.5–5)
ALP SERPL-CCNC: 63 U/L (ref 38–126)
ALT SERPL-CCNC: 13 U/L (ref 4–34)
ANION GAP SERPL CALC-SCNC: 7 MMOL/L
AST SERPL-CCNC: 21 U/L (ref 14–36)
BASOPHILS # BLD AUTO: 0 K/UL (ref 0–0.2)
BASOPHILS NFR BLD AUTO: 0 %
BUN SERPL-SCNC: 6 MG/DL (ref 7–17)
CALCIUM SPEC-MCNC: 9.1 MG/DL (ref 8.4–10.2)
CHLORIDE SERPL-SCNC: 105 MMOL/L (ref 98–107)
CO2 SERPL-SCNC: 21 MMOL/L (ref 22–30)
EOSINOPHIL # BLD AUTO: 0.2 K/UL (ref 0–0.7)
EOSINOPHIL NFR BLD AUTO: 1 %
ERYTHROCYTE [DISTWIDTH] IN BLOOD BY AUTOMATED COUNT: 4.56 M/UL (ref 3.8–5.4)
ERYTHROCYTE [DISTWIDTH] IN BLOOD: 14.7 % (ref 11.5–15.5)
GLUCOSE SERPL-MCNC: 91 MG/DL (ref 74–99)
HCT VFR BLD AUTO: 35 % (ref 34–46)
HGB BLD-MCNC: 11.3 GM/DL (ref 11.4–16)
LYMPHOCYTES # SPEC AUTO: 3.4 K/UL (ref 1–4.8)
LYMPHOCYTES NFR SPEC AUTO: 28 %
MCH RBC QN AUTO: 24.9 PG (ref 25–35)
MCHC RBC AUTO-ENTMCNC: 32.4 G/DL (ref 31–37)
MCV RBC AUTO: 76.8 FL (ref 80–100)
MONOCYTES # BLD AUTO: 0.5 K/UL (ref 0–1)
MONOCYTES NFR BLD AUTO: 4 %
NEUTROPHILS # BLD AUTO: 7.7 K/UL (ref 1.3–7.7)
NEUTROPHILS NFR BLD AUTO: 64 %
PH UR: 5.5 [PH] (ref 5–8)
PLATELET # BLD AUTO: 332 K/UL (ref 150–450)
POTASSIUM SERPL-SCNC: 4.1 MMOL/L (ref 3.5–5.1)
PROT SERPL-MCNC: 6.7 G/DL (ref 6.3–8.2)
RBC UR QL: 1 /HPF (ref 0–5)
SODIUM SERPL-SCNC: 133 MMOL/L (ref 137–145)
SP GR UR: 1 (ref 1–1.03)
SQUAMOUS UR QL AUTO: 2 /HPF (ref 0–4)
UROBILINOGEN UR QL STRIP: <2 MG/DL (ref ?–2)
WBC # BLD AUTO: 11.9 K/UL (ref 3.8–10.6)
WBC # UR AUTO: 1 /HPF (ref 0–5)

## 2022-02-23 PROCEDURE — 80053 COMPREHEN METABOLIC PANEL: CPT

## 2022-02-23 PROCEDURE — 85025 COMPLETE CBC W/AUTO DIFF WBC: CPT

## 2022-02-23 PROCEDURE — 84702 CHORIONIC GONADOTROPIN TEST: CPT

## 2022-02-23 PROCEDURE — 76801 OB US < 14 WKS SINGLE FETUS: CPT

## 2022-02-23 PROCEDURE — 81001 URINALYSIS AUTO W/SCOPE: CPT

## 2022-02-23 PROCEDURE — 36415 COLL VENOUS BLD VENIPUNCTURE: CPT

## 2022-02-23 PROCEDURE — 99284 EMERGENCY DEPT VISIT MOD MDM: CPT

## 2022-02-23 NOTE — ED
General Adult HPI





- General


Chief complaint: Vaginal Bleeding


Stated complaint: 14 wks preg, vaginal bleeding


Time Seen by Provider: 22 18:23


Source: patient, RN notes reviewed


Mode of arrival: ambulatory


Limitations: no limitations





- History of Present Illness


Initial comments: 





31-year-old female, , presents to the emergency Department with complaints 

of vaginal bleeding, onset this morning.  Patient states the bleeding is brown 

discharge that began is a scant amount and has increased throughout the day.  

She does complain of mild lower abdominal cramping that alternates between the 

right and left side.  Patient states she is 14 weeks pregnant and reports that 

her previous pregnancy in November ended with an ectopic.  Patient states she 

has had 2 ultrasounds confirming the presence of live intrauterine pregnancy.  

States she was seen by her OB/GYN on Monday for a routine normal exam.  Patient 

denies any known triggers such as vigorous sexual intercourse or demanding 

physical activity.  Denies fever, chills, chest pain, shortness of breath, 

nausea, vomiting, diarrhea, dysuria, hematuria, or back pain.





- Related Data


                                Home Medications











 Medication  Instructions  Recorded  Confirmed


 


Acetaminophen Tab [Tylenol Tab] 1,000 mg PO Q6HR PRN 22


 


Prenatal Vit-Iron-Folic Acid 1 cap PO DAILY 22





[Prenatal-U Capsule (formulary)]   











                                    Allergies











Allergy/AdvReac Type Severity Reaction Status Date / Time


 


codeine Allergy  Unknown Verified 22 19:01


 


hydromorphone [From Dilaudid] Allergy  Chest Pain Verified 22 19:01


 


nitrofurantoin Allergy  Anaphylaxis Verified 22 19:01





[From Macrobid]     


 


morphine AdvReac  Unknown Verified 22 19:01














Review of Systems


ROS Statement: 


Those systems with pertinent positive or pertinent negative responses have been 

documented in the HPI.





ROS Other: All systems not noted in ROS Statement are negative.





Past Medical History


Past Medical History: No Reported History


Additional Past Medical History / Comment(s): Hernia


History of Any Multi-Drug Resistant Organisms: None Reported


Past Surgical History: Adenoidectomy, Bariatric Surgery, Cholecystectomy, 

Orthopedic Surgery, Tonsillectomy


Past Anesthesia/Blood Transfusion Reactions: No Reported Reaction


Past Psychological History: No Psychological Hx Reported


Smoking Status: Never smoker


Past Alcohol Use History: None Reported


Past Drug Use History: None Reported





- Past Family History


  ** Mother


Family Medical History: No Reported History





General Exam


Limitations: no limitations (Well-developed, well-nourished female in no acute 

distress.  Initial temperature 99.5, pulse 91, respirations 18, blood pressure 

150/85, pulse ox 99% on room air.)


General appearance: alert, in no apparent distress


ENT exam: Present: normal exam, normal oropharynx, mucous membranes moist


Neck exam: Present: normal inspection, full ROM.  Absent: tenderness, meningismu

s, lymphadenopathy


Respiratory exam: Present: normal lung sounds bilaterally.  Absent: respiratory 

distress, wheezes, rales, rhonchi, stridor, chest wall tenderness


Cardiovascular Exam: Present: regular rate, normal rhythm, normal heart sounds. 

Absent: systolic murmur, diastolic murmur, rubs, gallop, clicks


GI/Abdominal exam: Present: soft, normal bowel sounds.  Absent: distended, 

tenderness, guarding, rebound, rigid


Speculum exam: Present: vaginal discharge (Moderate amount of brown discharge 

noted in vaginal vault.  Cervical os is closed.)


Back exam: Present: normal inspection.  Absent: CVA tenderness (R), CVA 

tenderness (L)


Neurological exam: Present: alert, oriented X3, CN II-XII intact


Psychiatric exam: Present: normal affect, normal mood


Skin exam: Present: warm, dry, intact, normal color.  Absent: rash





Course


                                   Vital Signs











  22





  16:24 22:03


 


Temperature 99.5 F 98.3 F


 


Pulse Rate 91 74


 


Respiratory 18 19





Rate  


 


Blood Pressure 150/85 150/76


 


O2 Sat by Pulse 99 99





Oximetry  














Medical Decision Making





- Medical Decision Making





This is a 31-year-old female, G3 P Wander, who presents to the emergency 

department for evaluation of vaginal bleeding at the onset of her 14th week of 

pregnancy.  Upon exam, patient is well-appearing and in no acute distress.  She 

is tolerating oral intake without difficulty.  Abdomen is soft and nontender.  

Speculum exam doesn't reveal moderate amount of brown discharge.  Cervical os is

closed.  Laboratory studies were obtained showing a slightly elevated white 

blood cell count as expected in pregnancy.  Her sodium is slightly low at 133, 

however patient is tolerating oral intake without difficulty.  HCG is markedly 

elevated when compared with previous level.  Ultrasound was obtained showing 

presence of a live intrauterine pregnancy measuring 14 weeks.  No abnormality 

was seen.  I did speak with Dr. Grande who agrees this patient is suitable for 

discharge.  Instructed to follow up as scheduled.  Return parameters were 

discussed in detail.  Patient verbalizes understanding and agrees with this 

plan.  This patient's care was discussed with my attending Dr. Sharma.





- Lab Data


Result diagrams: 


                                 22 19:20





                                 22 19:20


                                   Lab Results











  22 Range/Units





  19:20 19:20 19:32 


 


WBC  11.9 H    (3.8-10.6)  k/uL


 


RBC  4.56    (3.80-5.40)  m/uL


 


Hgb  11.3 L    (11.4-16.0)  gm/dL


 


Hct  35.0    (34.0-46.0)  %


 


MCV  76.8 L    (80.0-100.0)  fL


 


MCH  24.9 L    (25.0-35.0)  pg


 


MCHC  32.4    (31.0-37.0)  g/dL


 


RDW  14.7    (11.5-15.5)  %


 


Plt Count  332    (150-450)  k/uL


 


MPV  7.7    


 


Neutrophils %  64    %


 


Lymphocytes %  28    %


 


Monocytes %  4    %


 


Eosinophils %  1    %


 


Basophils %  0    %


 


Neutrophils #  7.7    (1.3-7.7)  k/uL


 


Lymphocytes #  3.4    (1.0-4.8)  k/uL


 


Monocytes #  0.5    (0-1.0)  k/uL


 


Eosinophils #  0.2    (0-0.7)  k/uL


 


Basophils #  0.0    (0-0.2)  k/uL


 


Hypochromasia  Slight    


 


Sodium   133 L   (137-145)  mmol/L


 


Potassium   4.1   (3.5-5.1)  mmol/L


 


Chloride   105   ()  mmol/L


 


Carbon Dioxide   21 L   (22-30)  mmol/L


 


Anion Gap   7   mmol/L


 


BUN   6 L   (7-17)  mg/dL


 


Creatinine   0.58   (0.52-1.04)  mg/dL


 


Est GFR (CKD-EPI)AfAm   >90   (>60 ml/min/1.73 sqM)  


 


Est GFR (CKD-EPI)NonAf   >90   (>60 ml/min/1.73 sqM)  


 


Glucose   91   (74-99)  mg/dL


 


Calcium   9.1   (8.4-10.2)  mg/dL


 


Total Bilirubin   0.4   (0.2-1.3)  mg/dL


 


AST   21   (14-36)  U/L


 


ALT   13   (4-34)  U/L


 


Alkaline Phosphatase   63   ()  U/L


 


Total Protein   6.7   (6.3-8.2)  g/dL


 


Albumin   3.5   (3.5-5.0)  g/dL


 


HCG, Quant   27340.0   mIU/mL


 


Urine Color    Light Yellow  


 


Urine Appearance    Clear  (Clear)  


 


Urine pH    5.5  (5.0-8.0)  


 


Ur Specific Gravity    1.005  (1.001-1.035)  


 


Urine Protein    Negative  (Negative)  


 


Urine Glucose (UA)    Negative  (Negative)  


 


Urine Ketones    Negative  (Negative)  


 


Urine Blood    Trace H  (Negative)  


 


Urine Nitrite    Negative  (Negative)  


 


Urine Bilirubin    Negative  (Negative)  


 


Urine Urobilinogen    <2.0  (<2.0)  mg/dL


 


Ur Leukocyte Esterase    Negative  (Negative)  


 


Urine RBC    1  (0-5)  /hpf


 


Urine WBC    1  (0-5)  /hpf


 


Ur Squamous Epith Cells    2  (0-4)  /hpf


 


Amorphous Sediment    Rare H  (None)  /hpf


 


Urine Bacteria    Rare H  (None)  /hpf


 


Urine Mucus    Rare H  (None)  /hpf














- Radiology Data


Radiology results: report reviewed


Transabdominal ultrasound was obtained.  Report was reviewed in its entirety.  

Impression single live intrauterine pregnancy of 14 weeks estimated gestational 

age and without acute abnormality.





Disposition


Clinical Impression: 


 Second trimester bleeding, Threatened 





Disposition: HOME SELF-CARE


Instructions (If sedation given, give patient instructions):  Threatened 

Miscarriage (ED)


Additional Instructions: 


Keep all scheduled follow-up appointments.


Call Dr. Deleon in the morning to let her know that you were seen in the ER for 

brown vaginal discharge.


Return to the emergency department if you develop any new, worsening, or 

concerning symptoms (such as bright red bleeding or passing clots).


Is patient prescribed a controlled substance at d/c from ED?: No


Referrals: 


Jose M Fenrandez DO [Primary Care Provider] - 1-2 days


Alma Deleon DO [Doctor of Osteopathic Medicine] - 1-2 days


Time of Disposition: 21:51

## 2022-02-23 NOTE — US
EXAMINATION TYPE: Transabdominal

 

DATE OF EXAM: 2/23/2022 8:43 PM

 

COMPARISON: OB 1/12/22

 

CLINICAL HISTORY: vaginal bleeding, lower abdominal cramping/pain. Vaginal discharge and pelvic cramp
ing

 

EXAM PERFORMED:  Transabdominal (TA)

 

EXAM MEASUREMENTS:

 

GESTATIONAL AGE / DATING

 

Physician Established: (14 weeks/1 days) 

** EDC:  8/23/22

Dates by First Scan:  (8 weeks/1 days)  

** EDC: 8/23/22

Dates by Current Scan for: (14 weeks/0 days)  

** EDC: 8/24/22

 

MATERNAL ANATOMY 

 

Uterus: 16.4 x 8.5 x 11.1 cm

Right Ovary: 3.7 x 2.9 x 3.3 cm

Left Ovary: 3.7 x 3.9 x 3.6 cm

Post CDS / Adnexa: wnl

Presence of free fluid: no

Presence of corpus luteal cyst: no

Presence of subchorionic bleed: no

 

GESTATION / FETAL SURVEY

 

CRL: 8.02 (14 weeks/0 days)

MSD: wnl 

Yolk Sac (normal less than 6mm): Yolk sac not seen; placenta noted

Heart Rate: 163 bpm

Rhythm:  Normal

IUP:  Viable IUP

 

Beta HcG (if available):  [id number]

 

 

IMPRESSION:

Single live intrauterine pregnancy at 14 weeks estimated gestational age and without acute abnormalit
y.

## 2022-05-19 ENCOUNTER — HOSPITAL ENCOUNTER (OUTPATIENT)
Dept: HOSPITAL 47 - FBPOP | Age: 32
Discharge: HOME | End: 2022-05-19
Attending: OBSTETRICS & GYNECOLOGY
Payer: COMMERCIAL

## 2022-05-19 VITALS
RESPIRATION RATE: 18 BRPM | HEART RATE: 144 BPM | DIASTOLIC BLOOD PRESSURE: 77 MMHG | TEMPERATURE: 97.3 F | SYSTOLIC BLOOD PRESSURE: 144 MMHG

## 2022-05-19 DIAGNOSIS — O26.92: Primary | ICD-10-CM

## 2022-05-19 DIAGNOSIS — Z3A.26: ICD-10-CM

## 2022-05-19 PROCEDURE — 99213 OFFICE O/P EST LOW 20 MIN: CPT

## 2022-05-20 NOTE — P.MSEPDOC
Presenting Problems





- Arrival Data


Date of Arrival on Unit: 22


Time of Arrival on Unit: 14:03


Mode of Transport: Ambulatory





- Complaint


OB-Reason for Admission/Chief Complaint: Other


Comment: Patient presents to triage with complaint of tightness in upper abdomen

near.  ribs, racing heart, and headache. Patient reports having seen MFM for 

elevated blood.  pressures during pregnancy.





Prenatal Medical History





- Pregnancy Information


: 3


Para: 1


Term: 1


: 0


Abortions: Spontaneous or Elective: 1


Number of Living Children: 1





- Gestational Age


Gestational Age by CARLOS EDUARDO (wks/days): 26 Weeks and 2 Days





Review of Systems





- Review of Systems


Constitutional: No problems


Breast: No problems


ENT: No problems


Cardiovascular: No problems


Respiratory: No problems


Gastrointestinal: No problems


Genitourinary: No problems


Musculoskeletal: No problems


Neurological: No problems


Skin: No problems





Vital Signs





- Temperature


Temperature: 97.3 F


Temperature Source: Temporal Artery Scan





- Pulse


  ** Right Lateral


Pulse Rate: 144


Pulse Assessment Method: Automatic Cuff





- Respirations


Respiratory Rate: 18


Oxygen Delivery Method: Room Air


O2 Sat by Pulse Oximetry: 97





- Blood Pressure


  ** Right Arm


Blood Pressure: 144/77


Blood Pressure Mean: 99


Blood Pressure Source: Automatic Cuff





Medical Screen Scoring





- Fetal Assessment - Baby A


Baseline FHR: 146





Physician Notification





- Physician Notified


Physician Notified Date: 22


Physician Notified Time: 14:48


Physician: Alma Deleon Order Received: Yes





- Notification Comment


Comment: Dr. Deleon reported on paitients c/o chest tightness/pain racing heart 

rate, and.  headache. Repeat blood pressures reported. Featal doppler with heart

rate of 140-146bpm.  Patient to follow up with MFM for blood pressures. Patient 

to go to ER for further.  evaluation for chest tightness and pain.





Maternal Fetal Triage Index





- Stat/Priority 1


Stat Priority 1: No





- Urgent/Priority 2


Urgent Priority 2: No





- Prompt/Priority 3


Prompt Priority 3: Yes


Criteria Met for Priority 3: blood pressure of 144/77





Disposition





- Disposition


OB Disposition: Transfer to other dept./facility (Patient instructed to go to ER

for evaluation of chest tightness and racing heart rate)


Discharge Date: 22


Discharge Time: 14:59


I agree with the RN Medical Screening Exam: Yes


Case reviewed; plan agreed upon as documented in EMR&OBIX.: Yes


Comments: 





Patient has known gestational hypertension and is on labetalol.  She has been 

following with maternal-fetal medicine.  Her blood pressures are not higher than

she normally has been running.  She is advised to follow up with maternal-fetal 

medicine regarding her blood pressure management.  She is advised to go to the 

emergency room for evaluation of her chest tightness and an racing heart rate to

rule out pulmonary embolism.


Diagnosis: PREGNANCY RELATED CONDITIONS, UNSPECIFIED, SECOND TRIMESTER


Additional Diagnoses: 





Gestational hypertension

## 2022-06-26 ENCOUNTER — HOSPITAL ENCOUNTER (OUTPATIENT)
Dept: HOSPITAL 47 - FBPOP | Age: 32
Setting detail: OBSERVATION
LOS: 1 days | Discharge: HOME | End: 2022-06-27
Attending: OBSTETRICS & GYNECOLOGY | Admitting: OBSTETRICS & GYNECOLOGY
Payer: COMMERCIAL

## 2022-06-26 DIAGNOSIS — Z71.9: ICD-10-CM

## 2022-06-26 DIAGNOSIS — R51.9: ICD-10-CM

## 2022-06-26 DIAGNOSIS — Z79.899: ICD-10-CM

## 2022-06-26 DIAGNOSIS — Z88.1: ICD-10-CM

## 2022-06-26 DIAGNOSIS — Z79.82: ICD-10-CM

## 2022-06-26 DIAGNOSIS — O13.3: Primary | ICD-10-CM

## 2022-06-26 DIAGNOSIS — Z3A.31: ICD-10-CM

## 2022-06-26 DIAGNOSIS — O26.893: ICD-10-CM

## 2022-06-26 DIAGNOSIS — Z88.5: ICD-10-CM

## 2022-06-26 DIAGNOSIS — Z90.49: ICD-10-CM

## 2022-06-26 LAB
ALT SERPL-CCNC: 13 U/L (ref 4–34)
APTT BLD: 22.1 SEC (ref 22–30)
AST SERPL-CCNC: 20 U/L (ref 14–36)
BASOPHILS # BLD AUTO: 0.1 K/UL (ref 0–0.2)
BASOPHILS NFR BLD AUTO: 1 %
BUN SERPL-SCNC: 6 MG/DL (ref 7–17)
EOSINOPHIL # BLD AUTO: 0.2 K/UL (ref 0–0.7)
EOSINOPHIL NFR BLD AUTO: 1 %
ERYTHROCYTE [DISTWIDTH] IN BLOOD BY AUTOMATED COUNT: 4.07 M/UL (ref 3.8–5.4)
ERYTHROCYTE [DISTWIDTH] IN BLOOD: 15.7 % (ref 11.5–15.5)
FIBRINOGEN PPP-MCNC: 741 MG/DL (ref 200–500)
HCT VFR BLD AUTO: 31.7 % (ref 34–46)
HGB BLD-MCNC: 10.1 GM/DL (ref 11.4–16)
INR PPP: 0.9 (ref ?–1.2)
LDH SPEC-CCNC: 447 U/L (ref 313–618)
LYMPHOCYTES # SPEC AUTO: 3.2 K/UL (ref 1–4.8)
LYMPHOCYTES NFR SPEC AUTO: 25 %
MCH RBC QN AUTO: 24.8 PG (ref 25–35)
MCHC RBC AUTO-ENTMCNC: 31.9 G/DL (ref 31–37)
MCV RBC AUTO: 77.9 FL (ref 80–100)
MONOCYTES # BLD AUTO: 0.7 K/UL (ref 0–1)
MONOCYTES NFR BLD AUTO: 5 %
NEUTROPHILS # BLD AUTO: 8.5 K/UL (ref 1.3–7.7)
NEUTROPHILS NFR BLD AUTO: 66 %
PH UR: 6.5 [PH] (ref 5–8)
PLATELET # BLD AUTO: 403 K/UL (ref 150–450)
PROT/CREAT UR-RTO: 0.37
PT BLD: 9.7 SEC (ref 9–12)
SP GR UR: 1 (ref 1–1.03)
URATE SERPL-MCNC: 3.7 MG/DL (ref 3.7–7.4)
UROBILINOGEN UR QL STRIP: <2 MG/DL (ref ?–2)
WBC # BLD AUTO: 12.9 K/UL (ref 3.8–10.6)

## 2022-06-26 PROCEDURE — 85730 THROMBOPLASTIN TIME PARTIAL: CPT

## 2022-06-26 PROCEDURE — 81001 URINALYSIS AUTO W/SCOPE: CPT

## 2022-06-26 PROCEDURE — 83615 LACTATE (LD) (LDH) ENZYME: CPT

## 2022-06-26 PROCEDURE — 96372 THER/PROPH/DIAG INJ SC/IM: CPT

## 2022-06-26 PROCEDURE — 85610 PROTHROMBIN TIME: CPT

## 2022-06-26 PROCEDURE — 85025 COMPLETE CBC W/AUTO DIFF WBC: CPT

## 2022-06-26 PROCEDURE — 85384 FIBRINOGEN ACTIVITY: CPT

## 2022-06-26 PROCEDURE — 82565 ASSAY OF CREATININE: CPT

## 2022-06-26 PROCEDURE — 84156 ASSAY OF PROTEIN URINE: CPT

## 2022-06-26 PROCEDURE — 99215 OFFICE O/P EST HI 40 MIN: CPT

## 2022-06-26 PROCEDURE — 82570 ASSAY OF URINE CREATININE: CPT

## 2022-06-26 PROCEDURE — 84520 ASSAY OF UREA NITROGEN: CPT

## 2022-06-26 PROCEDURE — 84450 TRANSFERASE (AST) (SGOT): CPT

## 2022-06-26 PROCEDURE — 81003 URINALYSIS AUTO W/O SCOPE: CPT

## 2022-06-26 PROCEDURE — 84550 ASSAY OF BLOOD/URIC ACID: CPT

## 2022-06-26 PROCEDURE — 84460 ALANINE AMINO (ALT) (SGPT): CPT

## 2022-06-26 PROCEDURE — 76819 FETAL BIOPHYS PROFIL W/O NST: CPT

## 2022-06-26 RX ADMIN — LABETALOL HCL SCH MG: 200 TABLET, FILM COATED ORAL at 21:09

## 2022-06-26 RX ADMIN — FAMOTIDINE SCH MG: 20 TABLET, FILM COATED ORAL at 21:09

## 2022-06-26 RX ADMIN — FAMOTIDINE SCH MG: 20 TABLET, FILM COATED ORAL at 09:52

## 2022-06-26 RX ADMIN — LABETALOL HCL SCH MG: 200 TABLET, FILM COATED ORAL at 09:12

## 2022-06-26 RX ADMIN — BETAMETHASONE SODIUM PHOSPHATE AND BETAMETHASONE ACETATE SCH MG: 3; 3 INJECTION, SUSPENSION INTRA-ARTICULAR; INTRALESIONAL; INTRAMUSCULAR at 02:38

## 2022-06-26 RX ADMIN — ACETAMINOPHEN PRN MG: 500 TABLET ORAL at 03:36

## 2022-06-26 RX ADMIN — ACETAMINOPHEN PRN MG: 500 TABLET ORAL at 19:19

## 2022-06-26 NOTE — P.HPOB
History of Present Illness


H&P Date: 22


Chief Complaint: Headache





32-year-old  presents at 31 weeks and 5 days after her baby shower with a 

headache and elevated blood pressure.  She does have gestational hypertension is

on labetalol 200 mg twice a day.  She noticed that she had a headache this 

evening and just wasn't feeling very good so came to triage to be evaluated.  

Her blood pressures were initially elevated and have come down to normal.  Her 

labs were normal except for PC ratio 0.3.  I will admit her for course of 

Celestone and repeat labs.





Review of Systems


All systems: negative


Constitutional: Denies chills, Denies fever


Eyes: denies blurred vision, denies pain


Ears, nose, mouth and throat: Reports headache, Denies sore throat


Cardiovascular: Denies chest pain, Denies shortness of breath


Respiratory: Denies cough


Gastrointestinal: Denies abdominal pain, Denies diarrhea, Denies nausea, Denies 

vomiting


Genitourinary: Denies dysuria, Denies hematuria


Musculoskeletal: Denies myalgias


Integumentary: Denies pruritus, Denies rash


Neurological: Denies numbness, Denies weakness


Psychiatric: Denies anxiety, Denies depression


Endocrine: Denies fatigue, Denies weight change





Past Medical History


Past Medical History: No Reported History


Additional Past Medical History / Comment(s): Hernia, ectopic pregnancy,


History of Any Multi-Drug Resistant Organisms: None Reported


Past Surgical History: Adenoidectomy, Bariatric Surgery, Cholecystectomy, 

Orthopedic Surgery, Tonsillectomy


Past Anesthesia/Blood Transfusion Reactions: No Reported Reaction


Past Psychological History: No Psychological Hx Reported


Smoking Status: Never smoker


Past Alcohol Use History: None Reported


Past Drug Use History: None Reported





- Past Family History


  ** Mother


Family Medical History: No Reported History





Medications and Allergies


                                Home Medications











 Medication  Instructions  Recorded  Confirmed  Type


 


Aspirin 81 mg PO DAILY 22 History


 


Labetalol [Trandate] 200 mg PO BID 22 History








                                    Allergies











Allergy/AdvReac Type Severity Reaction Status Date / Time


 


codeine Allergy  Unknown Verified 22 01:45


 


hydromorphone [From Dilaudid] Allergy  Chest Pain Verified 22 01:45


 


nitrofurantoin Allergy  Anaphylaxis Verified 22 01:45





[From Macrobid]     


 


morphine AdvReac  Unknown Verified 22 01:45














Exam


Osteopathic Statement: *.  No significant issues noted on an osteopathic 

structural exam other than those noted in the History and Physical/Consult.


                                   Vital Signs











  Temp Pulse Resp BP Pulse Ox


 


 22 07:51  97.6 F  86  18  120/76 


 


 22 03:30  98.1 F  76  18  155/104  98








                                Intake and Output











 22





 22:59 06:59 14:59


 


Other:   


 


  # Voids  1 


 


  Weight  196.859 kg 196.859 kg














Heart: Regular rate and rhythm


Lungs: Clear to auscultation bilaterally


Abdomen: Soft, nontender, gravid


Extremities: Negative Homans sign, 1+/4 DTR





Results


Result Diagrams: 


                                 22 01:34





                                 22 01:34


                  Abnormal Lab Results - Last 24 Hours (Table)











  22 Range/Units





  01:34 01:34 01:34 


 


WBC  12.9 H    (3.8-10.6)  k/uL


 


Hgb  10.1 L    (11.4-16.0)  gm/dL


 


Hct  31.7 L    (34.0-46.0)  %


 


MCV  77.9 L    (80.0-100.0)  fL


 


MCH  24.8 L    (25.0-35.0)  pg


 


RDW  15.7 H    (11.5-15.5)  %


 


Neutrophils #  8.5 H    (1.3-7.7)  k/uL


 


Fibrinogen   741 H   (200-500)  mg/dL


 


BUN    6 L  (7-17)  mg/dL














Assessment and Plan


(1) Gestational hypertension


Current Visit: Yes   Status: Acute   Code(s): O13.9 - GESTATIONAL HTN W/O 

SIGNIFICANT PROTEINURIA, UNSP TRIMESTER   SNOMED Code(s): 26402837


   





(2) 31 weeks gestation of pregnancy


Current Visit: Yes   Status: Acute   Code(s): Z3A.31 - 31 WEEKS GESTATION OF 

PREGNANCY   SNOMED Code(s): 40511728


   


Plan: 





1.  Admit for Celestone and repeat labs


2.  Monitor blood pressures closely


3.  We are unable to get the baby on the monitor for inadequate NST so did a BPP

yesterday and it was 8 out of 8

## 2022-06-26 NOTE — US
EXAM:

  US Biophysical Profile Without Non-Stress Testing

 

CLINICAL HISTORY:

  ITS.REASON US Reason: Unable to obtain NST

 

TECHNIQUE:

  Real-time ultrasound of the maternal pelvis for fetal biophysical 

profile evaluation with image documentation.

 

COMPARISON:

  2/23/2022

 

FINDINGS:

  Limitations:  Body habitus.

  Fetal breathing movements:  Present.  Score 2/2.

  Gross fetal body movements:  Present.  Score 2/2.

  Fetal tone:  Present.  Score 2/2.

  Qualitative amniotic fluid volume:  Within normal limits.  Score 2/2.  

HONG: 15.6 cm.

  Fetus:  Single live intrauterine pregnancy.

  Heart rate:  Fetal heart rate measures 150 bpm.

  Presentation:  Breech presentation.

 

IMPRESSION:     

  Normal biophysical profile with score of 8/8.

## 2022-06-26 NOTE — P.PN
Progress Note - Text


Progress Note Date: 06/26/22





I did review this case with my maternal-fetal medicine specialist, Dr. Vinson, who has also been taking the patient through this pregnancy.  He 

advised to keep her through the day and night and give her second dose of 

steroids.  She does have an appointment with him tomorrow and after discussion 

it was determined she can be discharged home tomorrow after her second dose of 

Celestone another lab draw and attend her appointment where he will see her and 

do an ultrasound.

## 2022-06-27 VITALS — SYSTOLIC BLOOD PRESSURE: 134 MMHG | DIASTOLIC BLOOD PRESSURE: 77 MMHG | HEART RATE: 99 BPM | TEMPERATURE: 97.9 F

## 2022-06-27 VITALS — RESPIRATION RATE: 18 BRPM

## 2022-06-27 LAB
ALT SERPL-CCNC: 14 U/L (ref 4–34)
APTT BLD: 22.3 SEC (ref 22–30)
AST SERPL-CCNC: 16 U/L (ref 14–36)
BASOPHILS # BLD AUTO: 0 K/UL (ref 0–0.2)
BASOPHILS NFR BLD AUTO: 0 %
BUN SERPL-SCNC: 4 MG/DL (ref 7–17)
EOSINOPHIL # BLD AUTO: 0 K/UL (ref 0–0.7)
EOSINOPHIL NFR BLD AUTO: 0 %
ERYTHROCYTE [DISTWIDTH] IN BLOOD BY AUTOMATED COUNT: 3.65 M/UL (ref 3.8–5.4)
ERYTHROCYTE [DISTWIDTH] IN BLOOD: 15.8 % (ref 11.5–15.5)
FIBRINOGEN PPP-MCNC: 596 MG/DL (ref 200–500)
HCT VFR BLD AUTO: 28.9 % (ref 34–46)
HGB BLD-MCNC: 9.2 GM/DL (ref 11.4–16)
INR PPP: 0.9 (ref ?–1.2)
LDH SPEC-CCNC: 367 U/L (ref 313–618)
LYMPHOCYTES # SPEC AUTO: 1.5 K/UL (ref 1–4.8)
LYMPHOCYTES NFR SPEC AUTO: 12 %
MCH RBC QN AUTO: 25.1 PG (ref 25–35)
MCHC RBC AUTO-ENTMCNC: 31.6 G/DL (ref 31–37)
MCV RBC AUTO: 79.3 FL (ref 80–100)
MONOCYTES # BLD AUTO: 0.3 K/UL (ref 0–1)
MONOCYTES NFR BLD AUTO: 3 %
NEUTROPHILS # BLD AUTO: 10 K/UL (ref 1.3–7.7)
NEUTROPHILS NFR BLD AUTO: 84 %
PH UR: 6.5 [PH] (ref 5–8)
PLATELET # BLD AUTO: 344 K/UL (ref 150–450)
PROT/CREAT UR-RTO: 0.21
PT BLD: 10 SEC (ref 9–12)
RBC UR QL: <1 /HPF (ref 0–5)
SP GR UR: 1.01 (ref 1–1.03)
SQUAMOUS UR QL AUTO: 2 /HPF (ref 0–4)
URATE SERPL-MCNC: 3.8 MG/DL (ref 3.7–7.4)
UROBILINOGEN UR QL STRIP: <2 MG/DL (ref ?–2)
WBC # BLD AUTO: 11.9 K/UL (ref 3.8–10.6)
WBC #/AREA URNS HPF: 1 /HPF (ref 0–5)

## 2022-06-27 RX ADMIN — BETAMETHASONE SODIUM PHOSPHATE AND BETAMETHASONE ACETATE SCH MG: 3; 3 INJECTION, SUSPENSION INTRA-ARTICULAR; INTRALESIONAL; INTRAMUSCULAR at 02:27

## 2022-06-27 RX ADMIN — FAMOTIDINE SCH MG: 20 TABLET, FILM COATED ORAL at 07:46

## 2022-06-27 RX ADMIN — LABETALOL HCL SCH MG: 200 TABLET, FILM COATED ORAL at 07:46

## 2022-06-27 NOTE — P.DS
Providers


Date of admission: 


22 03:21





Expected date of discharge: 22


Attending physician: 


Nini Lawler





Primary care physician: 


Stated None





Hospital Course: 





See history and physical for details the patient admission.  Briefly this is a 

32-year-old female  3 para 1 at 31-5/7 weeks who presented with severe 

headache that did not go with Tylenol.  She was also having some leaves of 

nausea.  She was found to have elevated blood pressures on arrival and labs did 

show elevated protein to creatinine ratio at 0.37.  Labs were repeated and her 

PC ratio did go down to 0.2.  Dr. Lawler did discuss with Dr. Vinson at 

maternal-fetal medicine.  And he does plan to see her for her appointment this 

afternoon on 2022.  She did receive 2 doses of steroids while in the 

hospital here.  She states her headache has not changed since arrival.  She is 

advised to go directly to maternal fetal medicine this afternoon.  She is 

advised after discharge from there, to return if she has any new symptoms 

including severe headaches, blurry vision, epigastric pain, or elevated blood 

pressures at home.


Patient Condition at Discharge: Stable





Plan - Discharge Summary


New Discharge Prescriptions: 


No Action


   Labetalol [Trandate] 200 mg PO BID


   Aspirin 81 mg PO DAILY


Discharge Medication List





Aspirin 81 mg PO DAILY 22 [History]


Labetalol [Trandate] 200 mg PO BID 22 [History]








Follow up Appointment(s)/Referral(s): 


Alma Deleon DO [Doctor of Osteopathic Medicine] - 1 Week


Activity/Diet/Wound Care/Special Instructions: 


Activity as tolerated.  Diet as tolerated.


Discharge Disposition: HOME SELF-CARE

## 2022-07-01 ENCOUNTER — HOSPITAL ENCOUNTER (OUTPATIENT)
Dept: HOSPITAL 47 - FBPOP | Age: 32
Discharge: HOME | End: 2022-07-01
Attending: OBSTETRICS & GYNECOLOGY
Payer: COMMERCIAL

## 2022-07-01 DIAGNOSIS — O09.93: Primary | ICD-10-CM

## 2022-07-01 DIAGNOSIS — Z3A.32: ICD-10-CM

## 2022-07-01 PROCEDURE — 76819 FETAL BIOPHYS PROFIL W/O NST: CPT

## 2022-07-01 PROCEDURE — 59025 FETAL NON-STRESS TEST: CPT

## 2022-07-01 NOTE — US
EXAMINATION TYPE: US OB fetal BPP wo non-stress

 

DATE OF EXAM: 2022

 

COMPARISON: NONE

 

CLINICAL HISTORY: chronic HTN

 

EXAM PERFORMED:  Transabdominal (TA)

 

BPP PARAMETERS:

 

FETAL PRESENTATION:    Breech

HEART RATE:   127 bpm 

RHYTHM: Normal

HONG:   127

 

DIAPHRAGM IMAGED: yes

 

BPP SCORIN.  Breathin

(1 episode of fetal breathing of 30 second duration in 30 minutes of scanning time)

2.  Movement:  2

(at least 3 discrete body movements in 30 minutes)

3.  Tone:  2

(1 episode of active flexion/extension of limb)

4.  HONG: 2

(HONG index > 5cm)

 

*** TOTAL SCORE:   8  / 8

## 2022-07-02 NOTE — P.MSEPDOC
Presenting Problems





- Arrival Data


Date of Arrival on Unit: 07/01/22


Time of Arrival on Unit: 16:15


Mode of Transport: Ambulatory





- Complaint


OB-Reason for Admission/Chief Complaint: NST


Comment: pt presents to triage for NST and BPP per written order for Chronic 

HTN, I 10 code





Prenatal Medical History





- Gestational Age


Gestational Age by CARLOS EDUARDO (wks/days): 32 Weeks and 3 Days





- Prenatal History


Pregnancy Complications: Chronic HTN





Review of Systems





- Review of Systems


Constitutional: No problems


Breast: No problems


ENT: No problems


Cardiovascular: No problems


Respiratory: No problems


Gastrointestinal: No problems


Genitourinary: No problems


Musculoskeletal: No problems


Neurological: No problems


Skin: No problems





Medical Screen Scoring





- Fetal Assessment - Baby A


Baseline FHR: 140


Fetal Heart Rate - NICHD Category: Category I (Normal)


NST: Reactive





Physician Notification





- Physician Notified


Physician Notified Date: 07/01/22


Physician Notified Time: 17:00


Physician: Alma Deleon





- Notification Comment


Comment: pt presents to triage for NST and BPP per written order for Chronic 

HTN, I 10 code, reactive nst, 8/8 results for BPP, pt discharged home





Maternal Fetal Triage Index





- Maternal Fetal Triage Index


Presenting for scheduled procedure w/no complaint: Yes





- Scheduled/Requesting Priority 5


Scheduled/Requesting Priority 5: Yes


Criteria Met for Priority 5: pt presents to triage for NST and BPP per written 

order for Chronic HTN, I 10 code





Disposition





- Disposition


OB Disposition: Triage, Discharge to home, Written follow up instructions 

reviewed


Discharge Date: 07/01/22


Discharge Time: 17:05


I agree with the RN Medical Screening Exam: Yes


Case reviewed; plan agreed upon as documented in EMR&OBIX.: Yes


Diagnosis: SUPERVISION OF HIGH RISK PREGNANCY, UNSP, THIRD TRIMESTER

## 2022-07-05 ENCOUNTER — HOSPITAL ENCOUNTER (OUTPATIENT)
Dept: HOSPITAL 47 - FBPOP | Age: 32
Discharge: HOME | End: 2022-07-05
Attending: OBSTETRICS & GYNECOLOGY
Payer: COMMERCIAL

## 2022-07-05 VITALS
SYSTOLIC BLOOD PRESSURE: 123 MMHG | RESPIRATION RATE: 18 BRPM | TEMPERATURE: 98.1 F | HEART RATE: 106 BPM | DIASTOLIC BLOOD PRESSURE: 75 MMHG

## 2022-07-05 DIAGNOSIS — O10.013: Primary | ICD-10-CM

## 2022-07-05 PROCEDURE — 59025 FETAL NON-STRESS TEST: CPT

## 2022-07-07 ENCOUNTER — HOSPITAL ENCOUNTER (EMERGENCY)
Dept: HOSPITAL 47 - EC | Age: 32
LOS: 1 days | Discharge: HOME | End: 2022-07-08
Payer: COMMERCIAL

## 2022-07-07 VITALS — TEMPERATURE: 98.2 F

## 2022-07-07 DIAGNOSIS — Z88.5: ICD-10-CM

## 2022-07-07 DIAGNOSIS — L50.9: ICD-10-CM

## 2022-07-07 DIAGNOSIS — Z3A.31: ICD-10-CM

## 2022-07-07 DIAGNOSIS — T36.0X5A: ICD-10-CM

## 2022-07-07 DIAGNOSIS — Z88.3: ICD-10-CM

## 2022-07-07 DIAGNOSIS — O9A.213: Primary | ICD-10-CM

## 2022-07-07 DIAGNOSIS — Z88.0: ICD-10-CM

## 2022-07-07 LAB
HYALINE CASTS UR QL AUTO: 4 /LPF (ref 0–2)
PH UR: 6 [PH] (ref 5–8)
PROT UR QL: (no result)
RBC UR QL: 2 /HPF (ref 0–5)
SP GR UR: 1.01 (ref 1–1.03)
SQUAMOUS UR QL AUTO: 4 /HPF (ref 0–4)
UROBILINOGEN UR QL STRIP: <2 MG/DL (ref ?–2)
WBC # UR AUTO: 10 /HPF (ref 0–5)

## 2022-07-07 PROCEDURE — 96375 TX/PRO/DX INJ NEW DRUG ADDON: CPT

## 2022-07-07 PROCEDURE — 96374 THER/PROPH/DIAG INJ IV PUSH: CPT

## 2022-07-07 PROCEDURE — 85610 PROTHROMBIN TIME: CPT

## 2022-07-07 PROCEDURE — 76819 FETAL BIOPHYS PROFIL W/O NST: CPT

## 2022-07-07 PROCEDURE — 85025 COMPLETE CBC W/AUTO DIFF WBC: CPT

## 2022-07-07 PROCEDURE — 80053 COMPREHEN METABOLIC PANEL: CPT

## 2022-07-07 PROCEDURE — 84100 ASSAY OF PHOSPHORUS: CPT

## 2022-07-07 PROCEDURE — 83735 ASSAY OF MAGNESIUM: CPT

## 2022-07-07 PROCEDURE — 99284 EMERGENCY DEPT VISIT MOD MDM: CPT

## 2022-07-07 PROCEDURE — 82731 ASSAY OF FETAL FIBRONECTIN: CPT

## 2022-07-07 PROCEDURE — 85730 THROMBOPLASTIN TIME PARTIAL: CPT

## 2022-07-07 PROCEDURE — 81001 URINALYSIS AUTO W/SCOPE: CPT

## 2022-07-07 PROCEDURE — 36415 COLL VENOUS BLD VENIPUNCTURE: CPT

## 2022-07-07 NOTE — ED
Allergic Reaction HPI





- General


Chief complaint: Allergic Reaction


Stated complaint: poss alergic reaction


Time Seen by Provider: 07/07/22 22:17


Source: patient, RN notes reviewed, old records reviewed


Mode of arrival: EMS


Limitations: no limitations





- History of Present Illness


Initial Comments: 





This is a 32-year-old female to the emergency department for evaluation.  

Patient comes in for taking amoxicillin for tooth infection.  Patient has 

history of taking amoxicillin and having a little traction as well as taking 

amoxicillin and not having ALLERGIC reaction.  Patient decided taken for tooth 

pain and tooth infection began having significant itching over the course of her

extreme body.  Entire body.  Patient is also pregnant


MD Complaint: allergic reaction


-: hour(s)


Exposure: unknown


Symptoms: rash, itching


Severity: mild


Treatment Prior to Arrival: none


Previous Allergy History: none





- Related Data


                                Home Medications











 Medication  Instructions  Recorded  Confirmed


 


Aspirin 81 mg PO DAILY 05/19/22 07/18/22


 


Labetalol [Trandate] 200 mg PO BID 05/19/22 07/18/22


 


Prenatal Vit No.179/Iron/Folic 1 tab PO DAILY 07/01/22 07/18/22





[Prenatal Tablet]   


 


Famotidine [Pepcid] 20 mg PO BID 07/07/22 07/18/22











                                    Allergies











Allergy/AdvReac Type Severity Reaction Status Date / Time


 


amoxicillin Allergy  Anaphylaxis Verified 07/14/22 10:41


 


codeine Allergy  Unknown Verified 07/14/22 10:41


 


hydromorphone [From Dilaudid] Allergy  Chest Pain Verified 07/14/22 10:41


 


nitrofurantoin Allergy  Anaphylaxis Verified 07/14/22 10:41





[From Macrobid]     


 


morphine AdvReac  Unknown Verified 07/14/22 10:41














Review of Systems


ROS Statement: 


Those systems with pertinent positive or pertinent negative responses have been 

documented in the HPI.





ROS Other: All systems not noted in ROS Statement are negative.





Past Medical History


Past Medical History: No Reported History


Additional Past Medical History / Comment(s): Hernia, ectopic pregnancy,


History of Any Multi-Drug Resistant Organisms: None Reported


Past Surgical History: Adenoidectomy, Bariatric Surgery, Cholecystectomy, 

Orthopedic Surgery, Tonsillectomy


Past Anesthesia/Blood Transfusion Reactions: No Reported Reaction


Past Psychological History: No Psychological Hx Reported


Smoking Status: Never smoker





- Past Family History


  ** Mother


Family Medical History: No Reported History





General Exam


Limitations: no limitations


General appearance: alert, in no apparent distress


Head exam: Present: atraumatic, normocephalic, normal inspection


Eye exam: Present: normal appearance, PERRL, EOMI.  Absent: scleral icterus, 

conjunctival injection, periorbital swelling


ENT exam: Present: normal exam, mucous membranes moist


Neck exam: Present: normal inspection.  Absent: tenderness, meningismus, 

lymphadenopathy


Respiratory exam: Present: normal lung sounds bilaterally.  Absent: respiratory 

distress, wheezes, rales, rhonchi, stridor


Cardiovascular Exam: Present: regular rate, normal rhythm, normal heart sounds. 

Absent: systolic murmur, diastolic murmur, rubs, gallop, clicks


GI/Abdominal exam: Present: soft, normal bowel sounds.  Absent: distended, 

tenderness, guarding, rebound, rigid


Extremities exam: Present: normal inspection, full ROM, normal capillary refill.

 Absent: tenderness, pedal edema, joint swelling, calf tenderness


Back exam: Present: normal inspection


Neurological exam: Present: alert, oriented X3, CN II-XII intact


Psychiatric exam: Present: normal affect, normal mood


Skin exam: Present: warm, dry, intact, normal color.  Absent: rash





Course


                                   Vital Signs











  07/07/22 07/07/22 07/08/22





  22:04 22:27 03:46


 


Temperature 98.2 F  


 


Pulse Rate 95  72


 


Respiratory 16 20 16





Rate   


 


Blood Pressure 134/81  144/88


 


O2 Sat by Pulse 99  96





Oximetry   














- Reevaluation(s)


Reevaluation #1: 





07/09/22 


Medical record is reviewed


Reevaluation #2: 





07/09/22 


Patient informed of results and questions are answered


Reevaluation #3: 





07/09/22 


Patient symptoms are improved





- Consultations


Consultation #1: 





Spoke with on-call OB they can see patient in the office





Medical Decision Making





- Medical Decision Making





32-year-old female DF for evaluation of positive pregnancy with ALLERGIC 

reaction.  Patient symptoms of ALLERGIC reaction are improved.  Was seen by OB's

here in the ER for evaluation regards to pregnancy with chest this time patient 

is not labor, pregnancy is normal she can be discharged home





- Lab Data


Result diagrams: 


                                 07/08/22 01:51





                                 07/08/22 01:51


                                   Lab Results











  07/07/22 07/07/22 07/08/22 Range/Units





  22:40 23:50 01:51 


 


WBC    20.8 H  (3.8-10.6)  k/uL


 


RBC    4.33  (3.80-5.40)  m/uL


 


Hgb    10.6 L  (11.4-16.0)  gm/dL


 


Hct    33.3 L  (34.0-46.0)  %


 


MCV    77.0 L  (80.0-100.0)  fL


 


MCH    24.4 L  (25.0-35.0)  pg


 


MCHC    31.7  (31.0-37.0)  g/dL


 


RDW    15.6 H  (11.5-15.5)  %


 


Plt Count    411  (150-450)  k/uL


 


MPV    7.2  


 


Neutrophils %    91  %


 


Lymphocytes %    7  %


 


Monocytes %    1  %


 


Eosinophils %    0  %


 


Basophils %    0  %


 


Neutrophils #    18.9 H  (1.3-7.7)  k/uL


 


Lymphocytes #    1.5  (1.0-4.8)  k/uL


 


Monocytes #    0.2  (0-1.0)  k/uL


 


Eosinophils #    0.0  (0-0.7)  k/uL


 


Basophils #    0.0  (0-0.2)  k/uL


 


Hypochromasia    Moderate  


 


Microcytosis    Slight  


 


PT     (9.0-12.0)  sec


 


INR     (<1.2)  


 


APTT     (22.0-30.0)  sec


 


Sodium     (137-145)  mmol/L


 


Potassium     (3.5-5.1)  mmol/L


 


Chloride     ()  mmol/L


 


Carbon Dioxide     (22-30)  mmol/L


 


Anion Gap     mmol/L


 


BUN     (7-17)  mg/dL


 


Creatinine     (0.52-1.04)  mg/dL


 


Est GFR (CKD-EPI)AfAm     (>60 ml/min/1.73 sqM)  


 


Est GFR (CKD-EPI)NonAf     (>60 ml/min/1.73 sqM)  


 


Glucose     (74-99)  mg/dL


 


Calcium     (8.4-10.2)  mg/dL


 


Phosphorus     (2.5-4.5)  mg/dL


 


Magnesium     (1.6-2.3)  mg/dL


 


Total Bilirubin     (0.2-1.3)  mg/dL


 


AST     (14-36)  U/L


 


ALT     (4-34)  U/L


 


Alkaline Phosphatase     ()  U/L


 


Total Protein     (6.3-8.2)  g/dL


 


Albumin     (3.5-5.0)  g/dL


 


Urine Color  Yellow    


 


Urine Appearance  Cloudy H    (Clear)  


 


Urine pH  6.0    (5.0-8.0)  


 


Ur Specific Gravity  1.012    (1.001-1.035)  


 


Urine Protein  1+ H    (Negative)  


 


Urine Glucose (UA)  Negative    (Negative)  


 


Urine Ketones  Negative    (Negative)  


 


Urine Blood  Negative    (Negative)  


 


Urine Nitrite  Negative    (Negative)  


 


Urine Bilirubin  Negative    (Negative)  


 


Urine Urobilinogen  <2.0    (<2.0)  mg/dL


 


Ur Leukocyte Esterase  Moderate H    (Negative)  


 


Urine RBC  2    (0-5)  /hpf


 


Urine WBC  10 H    (0-5)  /hpf


 


Ur Squamous Epith Cells  4    (0-4)  /hpf


 


Urine Bacteria  Many H    (None)  /hpf


 


Hyaline Casts  4 H    (0-2)  /lpf


 


Urine Mucus  Occasional H    (None)  /hpf


 


Fetal Fibronectin   Negative   (Negative)  














  07/08/22 07/08/22 Range/Units





  01:51 01:51 


 


WBC    (3.8-10.6)  k/uL


 


RBC    (3.80-5.40)  m/uL


 


Hgb    (11.4-16.0)  gm/dL


 


Hct    (34.0-46.0)  %


 


MCV    (80.0-100.0)  fL


 


MCH    (25.0-35.0)  pg


 


MCHC    (31.0-37.0)  g/dL


 


RDW    (11.5-15.5)  %


 


Plt Count    (150-450)  k/uL


 


MPV    


 


Neutrophils %    %


 


Lymphocytes %    %


 


Monocytes %    %


 


Eosinophils %    %


 


Basophils %    %


 


Neutrophils #    (1.3-7.7)  k/uL


 


Lymphocytes #    (1.0-4.8)  k/uL


 


Monocytes #    (0-1.0)  k/uL


 


Eosinophils #    (0-0.7)  k/uL


 


Basophils #    (0-0.2)  k/uL


 


Hypochromasia    


 


Microcytosis    


 


PT  9.6   (9.0-12.0)  sec


 


INR  0.9   (<1.2)  


 


APTT  23.2   (22.0-30.0)  sec


 


Sodium   134 L  (137-145)  mmol/L


 


Potassium   4.4  (3.5-5.1)  mmol/L


 


Chloride   108 H  ()  mmol/L


 


Carbon Dioxide   19 L  (22-30)  mmol/L


 


Anion Gap   7  mmol/L


 


BUN   7  (7-17)  mg/dL


 


Creatinine   0.60  (0.52-1.04)  mg/dL


 


Est GFR (CKD-EPI)AfAm   >90  (>60 ml/min/1.73 sqM)  


 


Est GFR (CKD-EPI)NonAf   >90  (>60 ml/min/1.73 sqM)  


 


Glucose   131 H  (74-99)  mg/dL


 


Calcium   8.8  (8.4-10.2)  mg/dL


 


Phosphorus   4.1  (2.5-4.5)  mg/dL


 


Magnesium   1.7  (1.6-2.3)  mg/dL


 


Total Bilirubin   0.3  (0.2-1.3)  mg/dL


 


AST   18  (14-36)  U/L


 


ALT   13  (4-34)  U/L


 


Alkaline Phosphatase   105  ()  U/L


 


Total Protein   6.9  (6.3-8.2)  g/dL


 


Albumin   3.5  (3.5-5.0)  g/dL


 


Urine Color    


 


Urine Appearance    (Clear)  


 


Urine pH    (5.0-8.0)  


 


Ur Specific Gravity    (1.001-1.035)  


 


Urine Protein    (Negative)  


 


Urine Glucose (UA)    (Negative)  


 


Urine Ketones    (Negative)  


 


Urine Blood    (Negative)  


 


Urine Nitrite    (Negative)  


 


Urine Bilirubin    (Negative)  


 


Urine Urobilinogen    (<2.0)  mg/dL


 


Ur Leukocyte Esterase    (Negative)  


 


Urine RBC    (0-5)  /hpf


 


Urine WBC    (0-5)  /hpf


 


Ur Squamous Epith Cells    (0-4)  /hpf


 


Urine Bacteria    (None)  /hpf


 


Hyaline Casts    (0-2)  /lpf


 


Urine Mucus    (None)  /hpf


 


Fetal Fibronectin    (Negative)  














- Radiology Data


Radiology results: report reviewed (Ultrasound of pregnancy is normal), image 

reviewed





Disposition


Clinical Impression: 


 Allergic reaction, Allergic reaction to drug, Urticaria, Pregnancy, 31 weeks 

gestation of pregnancy





Disposition: HOME SELF-CARE


Condition: Good


Instructions (If sedation given, give patient instructions):  Urticaria (ED)


Is patient prescribed a controlled substance at d/c from ED?: No


Referrals: 


Jose M Fernandez DO [Primary Care Provider] - 1-2 days


Time of Disposition: 03:30

## 2022-07-08 ENCOUNTER — HOSPITAL ENCOUNTER (OUTPATIENT)
Dept: HOSPITAL 47 - FBPOP | Age: 32
Discharge: HOME | End: 2022-07-08
Attending: OBSTETRICS & GYNECOLOGY
Payer: COMMERCIAL

## 2022-07-08 VITALS — HEART RATE: 72 BPM | DIASTOLIC BLOOD PRESSURE: 88 MMHG | SYSTOLIC BLOOD PRESSURE: 144 MMHG | RESPIRATION RATE: 16 BRPM

## 2022-07-08 VITALS
RESPIRATION RATE: 16 BRPM | SYSTOLIC BLOOD PRESSURE: 130 MMHG | DIASTOLIC BLOOD PRESSURE: 80 MMHG | HEART RATE: 85 BPM | TEMPERATURE: 97.2 F

## 2022-07-08 DIAGNOSIS — I10: Primary | ICD-10-CM

## 2022-07-08 LAB
ALBUMIN SERPL-MCNC: 3.5 G/DL (ref 3.5–5)
ALP SERPL-CCNC: 105 U/L (ref 38–126)
ALT SERPL-CCNC: 13 U/L (ref 4–34)
ANION GAP SERPL CALC-SCNC: 7 MMOL/L
APTT BLD: 23.2 SEC (ref 22–30)
AST SERPL-CCNC: 18 U/L (ref 14–36)
BASOPHILS # BLD AUTO: 0 K/UL (ref 0–0.2)
BASOPHILS NFR BLD AUTO: 0 %
BUN SERPL-SCNC: 7 MG/DL (ref 7–17)
CALCIUM SPEC-MCNC: 8.8 MG/DL (ref 8.4–10.2)
CHLORIDE SERPL-SCNC: 108 MMOL/L (ref 98–107)
CO2 SERPL-SCNC: 19 MMOL/L (ref 22–30)
EOSINOPHIL # BLD AUTO: 0 K/UL (ref 0–0.7)
EOSINOPHIL NFR BLD AUTO: 0 %
ERYTHROCYTE [DISTWIDTH] IN BLOOD BY AUTOMATED COUNT: 4.33 M/UL (ref 3.8–5.4)
ERYTHROCYTE [DISTWIDTH] IN BLOOD: 15.6 % (ref 11.5–15.5)
GLUCOSE SERPL-MCNC: 131 MG/DL (ref 74–99)
HCT VFR BLD AUTO: 33.3 % (ref 34–46)
HGB BLD-MCNC: 10.6 GM/DL (ref 11.4–16)
INR PPP: 0.9 (ref ?–1.2)
LYMPHOCYTES # SPEC AUTO: 1.5 K/UL (ref 1–4.8)
LYMPHOCYTES NFR SPEC AUTO: 7 %
MAGNESIUM SPEC-SCNC: 1.7 MG/DL (ref 1.6–2.3)
MCH RBC QN AUTO: 24.4 PG (ref 25–35)
MCHC RBC AUTO-ENTMCNC: 31.7 G/DL (ref 31–37)
MCV RBC AUTO: 77 FL (ref 80–100)
MONOCYTES # BLD AUTO: 0.2 K/UL (ref 0–1)
MONOCYTES NFR BLD AUTO: 1 %
NEUTROPHILS # BLD AUTO: 18.9 K/UL (ref 1.3–7.7)
NEUTROPHILS NFR BLD AUTO: 91 %
PLATELET # BLD AUTO: 411 K/UL (ref 150–450)
POTASSIUM SERPL-SCNC: 4.4 MMOL/L (ref 3.5–5.1)
PROT SERPL-MCNC: 6.9 G/DL (ref 6.3–8.2)
PT BLD: 9.6 SEC (ref 9–12)
SODIUM SERPL-SCNC: 134 MMOL/L (ref 137–145)
WBC # BLD AUTO: 20.8 K/UL (ref 3.8–10.6)

## 2022-07-08 PROCEDURE — 59025 FETAL NON-STRESS TEST: CPT

## 2022-07-08 NOTE — US
EXAM:

  US Pregnancy, Transvaginal

 

CLINICAL HISTORY:

  ITS.REASON US Reason: abdominal

 

TECHNIQUE:

  Real-time transvaginal obstetrical ultrasound of the maternal pelvis 

and a first trimester pregnancy with image documentation.  Transvaginal 

imaging was used for better evaluation of the fetus and adnexa.

 

COMPARISON:

Comparison made to prior OB ultrasound from June 26, 2022.

 

FINDINGS:

  Gestation: There is a single live IUP with heart rate measured 140 bpm. 

 The presentation is breech with a large genital visualized.  The BPP 

score is 8/8.

  Placenta/amniotic fluid: The HONG is normal measuring 16.1 cm.

  Uterus/cervix:  Unremarkable.  No myometrial mass.

  Ovaries: Unremarkable.  No mass.

  Free fluid:  No free fluid.

 

IMPRESSION:     

Single live IUP with heart rate measured 140 bpm.

 

The biophysical profile score is 8/8.

## 2022-07-11 ENCOUNTER — HOSPITAL ENCOUNTER (OUTPATIENT)
Dept: HOSPITAL 47 - FBPOP | Age: 32
Discharge: HOME | End: 2022-07-11
Attending: OBSTETRICS & GYNECOLOGY
Payer: COMMERCIAL

## 2022-07-11 VITALS
TEMPERATURE: 98 F | SYSTOLIC BLOOD PRESSURE: 134 MMHG | HEART RATE: 100 BPM | DIASTOLIC BLOOD PRESSURE: 80 MMHG | RESPIRATION RATE: 18 BRPM

## 2022-07-11 DIAGNOSIS — O99.213: Primary | ICD-10-CM

## 2022-07-11 DIAGNOSIS — Z3A.33: ICD-10-CM

## 2022-07-11 PROCEDURE — 99213 OFFICE O/P EST LOW 20 MIN: CPT

## 2022-07-11 PROCEDURE — 59025 FETAL NON-STRESS TEST: CPT

## 2022-07-12 NOTE — P.MSEPDOC
Presenting Problems





- Arrival Data


Date of Arrival on Unit: 22


Time of Arrival on Unit: 16:08


Mode of Transport: Ambulatory





- Complaint


OB-Reason for Admission/Chief Complaint: NST


Comment: routine NST





Prenatal Medical History





- Pregnancy Information


: 3


Para: 1


Term: 1


: 0


Abortions: Spontaneous or Elective: 1


Number of Living Children: 1





- Gestational Age


Gestational Age by CARLOS EDUARDO (wks/days): 33 Weeks and 6 Days





Review of Systems





- Review of Systems


Constitutional: No problems


Breast: No problems


ENT: No problems


Cardiovascular: No problems


Respiratory: No problems


Gastrointestinal: No problems


Genitourinary: No problems


Musculoskeletal: No problems


Neurological: No problems


Skin: No problems





Vital Signs





- Temperature


Temperature: 98.0 F


Temperature Source: Temporal Artery Scan





- Pulse


  ** Right Pulse Oximetery


Pulse Rate: 100


Pulse Assessment Method: Pulse Oximetry





- Respirations


Respiratory Rate: 18


Oxygen Delivery Method: Room Air


O2 Sat by Pulse Oximetry: 98





- Blood Pressure


  ** Right Arm


Blood Pressure: 134/80


Blood Pressure Mean: 98


Blood Pressure Source: Automatic Cuff





Medical Screen Scoring





- Uterine Contractions


Frequency From (mins): 0


Frequency To (mins): 0





- Fetal Assessment - Baby A


Baseline FHR: 145


Fetal Heart Rate - NICHD Category: Category I (Normal)


NST: Reactive





Physician Notification





- Physician Notified


Physician Notified Date: 22


Physician Notified Time: 17:08


Physician: Nini Lawler Order Received: Yes





Maternal Fetal Triage Index





- Maternal Fetal Triage Index


Presenting for scheduled procedure w/no complaint: Yes





- Scheduled/Requesting Priority 5


Scheduled/Requesting Priority 5: Yes


Criteria Met for Priority 5: NST





Disposition





- Disposition


OB Disposition: Discharge to home


Discharge Date: 22


Discharge Time: 17:13


I agree with the RN Medical Screening Exam: Yes


Case reviewed; plan agreed upon as documented in EMR&OBIX.: Yes


Diagnosis: OBESITY COMPLICATING PREGNANCY, THIRD TRIMESTER

## 2022-07-14 ENCOUNTER — HOSPITAL ENCOUNTER (OUTPATIENT)
Dept: HOSPITAL 47 - FBPOP | Age: 32
Discharge: HOME | End: 2022-07-14
Attending: OBSTETRICS & GYNECOLOGY
Payer: COMMERCIAL

## 2022-07-14 VITALS
SYSTOLIC BLOOD PRESSURE: 170 MMHG | RESPIRATION RATE: 17 BRPM | TEMPERATURE: 97.9 F | HEART RATE: 103 BPM | DIASTOLIC BLOOD PRESSURE: 83 MMHG

## 2022-07-14 DIAGNOSIS — Z3A.34: ICD-10-CM

## 2022-07-14 DIAGNOSIS — O16.3: Primary | ICD-10-CM

## 2022-07-14 PROCEDURE — 76819 FETAL BIOPHYS PROFIL W/O NST: CPT

## 2022-07-14 PROCEDURE — 59025 FETAL NON-STRESS TEST: CPT

## 2022-07-14 NOTE — US
EXAMINATION TYPE: US OB fetal BPP wo non-stress

 

DATE OF EXAM: 2022

 

COMPARISON: US 2020

 

CLINICAL HISTORY: gest HTN. Gestational hypertension. 

Hx ectopic pregnancy and left fallopian tube removed. .

*Image quality limited due to body habitus.

 

EXAM PERFORMED:  Transabdominal (TA)

 

BPP PARAMETERS:

 

FETAL PRESENTATION:    Breech

HEART RATE:   144 bpm 

RHYTHM: Normal

HONG:   14.8

 

DIAPHRAGM IMAGED: yes

 

BPP SCORIN.  Breathin

(1 episode of fetal breathing of 30 second duration in 30 minutes of scanning time)

2.  Movement:  2

(at least 3 discrete body movements in 30 minutes)

3.  Tone:  2

(1 episode of active flexion/extension of limb)

4.  HONG: 2

(HONG index > 5cm)

 

*** TOTAL SCORE:     8   / 8

## 2022-07-16 NOTE — P.MSEPDOC
Presenting Problems





- Arrival Data


Date of Arrival on Unit: 22


Time of Arrival on Unit: 10:23


Mode of Transport: Ambulatory





- Complaint


OB-Reason for Admission/Chief Complaint: NST, Other


Comment: pt presented to triage for twice a week nst, and weekly BPP per written

orders by Dr. Deleon, I 10 Code





Prenatal Medical History





- Pregnancy Information


: 3


Para: 1


Term: 1


: 0


Abortions: Spontaneous or Elective: 1


Number of Living Children: 1





- Gestational Age


Gestational Age by CARLOS EDUARDO (wks/days): 34 Weeks and 2 Days





- Prenatal History


Pregnancy Complications: Other


Comment: gest HTN





Review of Systems





- Review of Systems


Constitutional: No problems


Breast: No problems


ENT: No problems


Cardiovascular: No problems


Respiratory: No problems


Gastrointestinal: No problems


Genitourinary: No problems


Musculoskeletal: No problems


Neurological: No problems


Skin: No problems





Vital Signs





- Temperature


Temperature: 97.9 F


Temperature Source: Axillary





- Pulse


  ** Right Brachial


Pulse Rate: 103


Pulse Assessment Method: Automatic Cuff





- Respirations


Respiratory Rate: 17


Oxygen Delivery Method: Room Air





- Blood Pressure


  ** Right Arm


Blood Pressure: 170/83


Blood Pressure Mean: 112


Blood Pressure Source: Automatic Cuff





Physician Notification





- Physician Notified


Physician Notified Date: 22


Physician Notified Time: 12:15


Physician: Alma Deleon Order Received: Yes





- Notification Comment


Comment: Dr. Deleon notified of reactive NST, results of BPP, orders to discharge 

pt home and follow up with MFM and Dr. Deleon at next scheduled appointment, I 10 

code





Maternal Fetal Triage Index





- Maternal Fetal Triage Index


Presenting for scheduled procedure w/no complaint: Yes





- Scheduled/Requesting Priority 5


Scheduled/Requesting Priority 5: Yes


Criteria Met for Priority 5: pt presented to triage for twice a week nst, and 

weekly BPP per written orders by Dr. Deleon, GA 34 





Disposition





- Disposition


OB Disposition: Triage, Discharge to home, Written follow up instructions 

reviewed


Discharge Date: 22


Discharge Time: 12:20


I agree with the RN Medical Screening Exam: Yes


Case reviewed; plan agreed upon as documented in EMR&OBIX.: Yes


Diagnosis: ESSENTIAL (PRIMARY) HYPERTENSION

## 2022-07-18 ENCOUNTER — HOSPITAL ENCOUNTER (OUTPATIENT)
Dept: HOSPITAL 47 - FBPOP | Age: 32
Discharge: HOME | End: 2022-07-18
Attending: OBSTETRICS & GYNECOLOGY
Payer: COMMERCIAL

## 2022-07-18 VITALS
RESPIRATION RATE: 16 BRPM | DIASTOLIC BLOOD PRESSURE: 82 MMHG | SYSTOLIC BLOOD PRESSURE: 138 MMHG | TEMPERATURE: 98.3 F | HEART RATE: 104 BPM

## 2022-07-18 DIAGNOSIS — O09.33: Primary | ICD-10-CM

## 2022-07-18 DIAGNOSIS — Z3A.34: ICD-10-CM

## 2022-07-18 PROCEDURE — 59025 FETAL NON-STRESS TEST: CPT

## 2022-07-19 NOTE — P.MSEPDOC
Presenting Problems





- Arrival Data


Date of Arrival on Unit: 22


Time of Arrival on Unit: 19:07


Mode of Transport: Ambulatory





- Complaint


OB-Reason for Admission/Chief Complaint: NST


Comment: Pt presents for an NST. Pt gets biweekly NSTs and weekly BPP.





Prenatal Medical History





- Pregnancy Information


: 3


Para: 1


Term: 1


Abortions: Spontaneous or Elective: 1


Number of Living Children: 1





- Gestational Age


Gestational Age by CARLOS EDUARDO (wks/days): 34 Weeks and 6 Days





- Prenatal History


Pregnancy Complications: Chronic HTN





Review of Systems





- Review of Systems


Constitutional: No problems


Breast: No problems


ENT: No problems


Cardiovascular: No problems


Respiratory: No problems


Gastrointestinal: No problems


Genitourinary: No problems


Musculoskeletal: No problems


Neurological: No problems


Skin: No problems





Vital Signs





- Temperature


Temperature: 98.3 F


Temperature Source: Oral





- Pulse


  ** Right Sitting


Pulse Rate: 104


Pulse Assessment Method: Automatic Cuff





- Respirations


Respiratory Rate: 16


Oxygen Delivery Method: Room Air


O2 Sat by Pulse Oximetry: 98





- Blood Pressure


  ** Right Arm Sitting


Blood Pressure: 138/82


Blood Pressure Mean: 100


Blood Pressure Source: Automatic Cuff





Medical Screen Scoring





- Fetal Assessment - Baby A


Baseline FHR: 145


Fetal Heart Rate - NICHD Category: Category I (Normal)


NST: Reactive





Physician Notification





- Physician Notified


Physician Notified Date: 22


Physician Notified Time: 20:07


Physician: Alma Deleon Order Received: Yes (D/C home)





- Notification Comment


Comment: I10, pt came in with standing order for NST





Maternal Fetal Triage Index





- Maternal Fetal Triage Index


Presenting for scheduled procedure w/no complaint: Yes





- Scheduled/Requesting Priority 5


Scheduled/Requesting Priority 5: Yes


Criteria Met for Priority 5: standing order for biweekly NST





Disposition





- Disposition


OB Disposition: Discharge to home, Written follow up instructions reviewed


Discharge Date: 22


Discharge Time: 20:10


I agree with the RN Medical Screening Exam: Yes


Case reviewed; plan agreed upon as documented in EMR&OBIX.: Yes


Diagnosis: SUPERVISION OF HIGH RISK PREGNANCY, UNSP, THIRD TRIMESTER

## 2022-07-21 ENCOUNTER — HOSPITAL ENCOUNTER (OUTPATIENT)
Dept: HOSPITAL 47 - FBPOP | Age: 32
Discharge: HOME | End: 2022-07-21
Attending: OBSTETRICS & GYNECOLOGY
Payer: COMMERCIAL

## 2022-07-21 VITALS
HEART RATE: 100 BPM | TEMPERATURE: 97.7 F | DIASTOLIC BLOOD PRESSURE: 88 MMHG | SYSTOLIC BLOOD PRESSURE: 140 MMHG | RESPIRATION RATE: 18 BRPM

## 2022-07-21 DIAGNOSIS — Z3A.35: ICD-10-CM

## 2022-07-21 DIAGNOSIS — O36.8130: Primary | ICD-10-CM

## 2022-07-21 PROCEDURE — 99213 OFFICE O/P EST LOW 20 MIN: CPT

## 2022-07-21 PROCEDURE — 59025 FETAL NON-STRESS TEST: CPT

## 2022-07-25 ENCOUNTER — HOSPITAL ENCOUNTER (OUTPATIENT)
Dept: HOSPITAL 47 - FBPOP | Age: 32
Discharge: HOME | End: 2022-07-25
Attending: OBSTETRICS & GYNECOLOGY
Payer: COMMERCIAL

## 2022-07-25 DIAGNOSIS — Z53.9: Primary | ICD-10-CM

## 2022-07-28 ENCOUNTER — HOSPITAL ENCOUNTER (OUTPATIENT)
Dept: HOSPITAL 47 - FBPOP | Age: 32
Discharge: HOME | End: 2022-07-28
Attending: OBSTETRICS & GYNECOLOGY
Payer: COMMERCIAL

## 2022-07-28 VITALS
RESPIRATION RATE: 16 BRPM | HEART RATE: 109 BPM | TEMPERATURE: 98.1 F | DIASTOLIC BLOOD PRESSURE: 83 MMHG | SYSTOLIC BLOOD PRESSURE: 160 MMHG

## 2022-07-28 DIAGNOSIS — Z3A.00: ICD-10-CM

## 2022-07-28 DIAGNOSIS — O13.3: Primary | ICD-10-CM

## 2022-07-28 LAB
ALT SERPL-CCNC: 10 U/L (ref 4–34)
AST SERPL-CCNC: 16 U/L (ref 14–36)
BASOPHILS # BLD AUTO: 0 K/UL (ref 0–0.2)
BASOPHILS NFR BLD AUTO: 0 %
EOSINOPHIL # BLD AUTO: 0.1 K/UL (ref 0–0.7)
EOSINOPHIL NFR BLD AUTO: 1 %
ERYTHROCYTE [DISTWIDTH] IN BLOOD BY AUTOMATED COUNT: 3.86 M/UL (ref 3.8–5.4)
ERYTHROCYTE [DISTWIDTH] IN BLOOD: 16.1 % (ref 11.5–15.5)
HCT VFR BLD AUTO: 30.3 % (ref 34–46)
HGB BLD-MCNC: 9.4 GM/DL (ref 11.4–16)
LYMPHOCYTES # SPEC AUTO: 2.3 K/UL (ref 1–4.8)
LYMPHOCYTES NFR SPEC AUTO: 21 %
MCH RBC QN AUTO: 24.4 PG (ref 25–35)
MCHC RBC AUTO-ENTMCNC: 31.2 G/DL (ref 31–37)
MCV RBC AUTO: 78.3 FL (ref 80–100)
MONOCYTES # BLD AUTO: 0.4 K/UL (ref 0–1)
MONOCYTES NFR BLD AUTO: 3 %
NEUTROPHILS # BLD AUTO: 8.3 K/UL (ref 1.3–7.7)
NEUTROPHILS NFR BLD AUTO: 74 %
PLATELET # BLD AUTO: 369 K/UL (ref 150–450)
URATE SERPL-MCNC: 3.8 MG/DL (ref 3.7–7.4)
WBC # BLD AUTO: 11.2 K/UL (ref 3.8–10.6)

## 2022-07-28 PROCEDURE — 84460 ALANINE AMINO (ALT) (SGPT): CPT

## 2022-07-28 PROCEDURE — 76819 FETAL BIOPHYS PROFIL W/O NST: CPT

## 2022-07-28 PROCEDURE — 84450 TRANSFERASE (AST) (SGOT): CPT

## 2022-07-28 PROCEDURE — 85025 COMPLETE CBC W/AUTO DIFF WBC: CPT

## 2022-07-28 PROCEDURE — 84550 ASSAY OF BLOOD/URIC ACID: CPT

## 2022-07-28 PROCEDURE — 59025 FETAL NON-STRESS TEST: CPT

## 2022-07-28 NOTE — P.MSEPDOC
Presenting Problems





- Arrival Data


Date of Arrival on Unit: 07/28/22


Time of Arrival on Unit: 20:00


Mode of Transport: Portable


I agree with the RN Medical Screening Exam: Yes


Case reviewed; plan agreed upon as documented in EMR&OBIX.: Yes


Diagnosis: GESTATIONAL HTN W/O SIGNIFICANT PROTEINURIA, THIRD TRIMESTER (Patient

presents to labor and delivery for antepartum surveillance including a nonstress

test and biophysical profile.  Patient states that she was supposed to do this 

tomorrow however she is going to to [facility] for the day and elected to come 

to labor and delivery this evening.  Initial blood pressure was elevated as it 

is in the office however serial blood pressure showed comes down to normal.  

Patient's been followed by Dr. Ramos for gestational hypertension and is on antihy

pertensives.  Patient did have blood work done last Friday at maternal fetal 

medicine apparently that was normal.  I did repeat her preeclampsia labs and 

again there are normal.  Nonstress test is reactive.  Physical is 8 out of 8.  

Patient is felt to be stable for discharge home follow per Dr. Deleon's 

instructions.  I did discuss all these findings with the patient and she is 

agreeable to this plan.)

## 2022-07-28 NOTE — US
EXAMINATION TYPE: US OB fetal BPP wo non-stress

 

DATE OF EXAM: 2022

 

COMPARISON: US 

 

CLINICAL HISTORY: weekly bpp for chronic hypertension.

 

TECHNIQUE:  Transabdominal (TA).  Scoring by the sonographer during real-time assessment.

 

FINDINGS:

 

BPP PARAMETERS:

 

FETAL PRESENTATION:    Breech

HEART RATE:   142 bpm 

RHYTHM: Normal

HONG:   14.8cm

 

DIAPHRAGM IMAGED: yes

 

BPP SCORIN.  Breathin

(1 episode of fetal breathing of 30 second duration in 30 minutes of scanning time)

2.  Movement:  2

(at least 3 discrete body movements in 30 minutes)

3.  Tone:  2

(1 episode of active flexion/extension of limb)

4.  HONG: 2

(HONG index > 5cm)

 

**Difficult and limited study due to morbidly obese patient

 

 

IMPRESSION:

TOTAL SCORE:   8  / 8

 

Normal biophysical profile.

## 2022-07-29 ENCOUNTER — HOSPITAL ENCOUNTER (OUTPATIENT)
Dept: HOSPITAL 47 - FBPOP | Age: 32
Discharge: HOME | End: 2022-07-29
Attending: OBSTETRICS & GYNECOLOGY
Payer: COMMERCIAL

## 2022-07-29 VITALS
RESPIRATION RATE: 18 BRPM | HEART RATE: 105 BPM | DIASTOLIC BLOOD PRESSURE: 88 MMHG | TEMPERATURE: 96.5 F | SYSTOLIC BLOOD PRESSURE: 140 MMHG

## 2022-07-29 DIAGNOSIS — O47.03: Primary | ICD-10-CM

## 2022-07-29 DIAGNOSIS — Z3A.36: ICD-10-CM

## 2022-07-29 LAB
PH UR: 7.5 [PH] (ref 5–8)
RBC UR QL: 1 /HPF (ref 0–5)
SP GR UR: 1.02 (ref 1–1.03)
SQUAMOUS UR QL AUTO: 1 /HPF (ref 0–4)
UROBILINOGEN UR QL STRIP: <2 MG/DL (ref ?–2)
WBC #/AREA URNS HPF: 2 /HPF (ref 0–5)

## 2022-07-29 PROCEDURE — 59025 FETAL NON-STRESS TEST: CPT

## 2022-07-29 PROCEDURE — 81001 URINALYSIS AUTO W/SCOPE: CPT

## 2022-07-29 PROCEDURE — 99213 OFFICE O/P EST LOW 20 MIN: CPT

## 2022-07-29 NOTE — P.MSEPDOC
Presenting Problems





- Arrival Data


Date of Arrival on Unit: 22


Time of Arrival on Unit: 20:40


Mode of Transport: Ambulatory





- Complaint


OB-Reason for Admission/Chief Complaint: Decreased Fetal Movement





Prenatal Medical History





- Pregnancy Information


: 3


Para: 1


Term: 1


: 0


Abortions: Spontaneous or Elective: 1


Number of Living Children: 1





- Gestational Age


Gestational Age by CARLOS EDUARDO (wks/days): 35 Weeks and 2 Days





Review of Systems





- Review of Systems


Constitutional: No problems


Breast: No problems


ENT: No problems


Cardiovascular: No problems


Respiratory: No problems


Gastrointestinal: No problems


Genitourinary: No problems


Musculoskeletal: No problems


Neurological: No problems


Skin: No problems





Vital Signs





- Temperature


Temperature: 97.7 F


Temperature Source: Temporal Artery Scan





- Pulse


  ** Left Pulse Oximetery


Pulse Rate: 100


Pulse Assessment Method: Pulse Oximetry





- Respirations


Respiratory Rate: 18


Oxygen Delivery Method: Room Air


O2 Sat by Pulse Oximetry: 98





- Blood Pressure


  ** Right Radial Artery


Blood Pressure: 140/88


Blood Pressure Mean: 105


Blood Pressure Source: Automatic Cuff





Medical Screen Scoring





- Cervical Exam


Membranes: Intact





- Fetal Assessment - Baby A


Baseline FHR: 145


Fetal Heart Rate - NICHD Category: Category I (Normal)


NST: Reactive





Physician Notification





- Physician Notified


Physician Notified Date: 22


Physician Notified Time: 21:50


Physician: Nini Lawler





- Notification Comment


Comment: Dr. Lawler given report at this time including FHTs. Pt okay to D/C 

home.





Maternal Fetal Triage Index





- Maternal Fetal Triage Index


Presenting for scheduled procedure w/no complaint: No





- Stat/Priority 1


Stat Priority 1: No





- Urgent/Priority 2


Urgent Priority 2: Yes


Provider Notified: Nini Lawler


Provider Notified Time: 21:50


Criteria Met for Priority 2: Pt is a  with CARLOS EDUARDO 22 here at 35.2 weeks of

gestation with c/o.  decreased FM. Pt states she has only felt baby move x1 

since earlier today, pt also reports attempting to use her home doppler without 

success.





Disposition





- Disposition


OB Disposition: Discharge to home


Discharge Date: 22


Discharge Time: 22:05


I agree with the RN Medical Screening Exam: Yes


Case reviewed; plan agreed upon as documented in EMR&OBIX.: Yes


Diagnosis: DECREASED FETAL MOVEMENTS, THIRD TRIMESTER, UNSP

## 2022-07-31 NOTE — P.MSEPDOC
Presenting Problems





- Arrival Data


Date of Arrival on Unit: 22


Time of Arrival on Unit: 18:40


Mode of Transport: Ambulatory





- Complaint


OB-Reason for Admission/Chief Complaint: Possible Onset of Labor





Prenatal Medical History





- Pregnancy Information


: 2


Para: 1


Term: 1


: 0


Abortions: Spontaneous or Elective: 0


Number of Living Children: 1





- Gestational Age


Gestational Age by CARLOS EDUARDO (wks/days): 36 Weeks and 3 Days





- Prenatal History


Pregnancy Complications: Chronic HTN, Breech





Review of Systems





- Review of Systems


Constitutional: No problems


Breast: No problems


ENT: No problems


Cardiovascular: No problems


Respiratory: No problems


Gastrointestinal: No problems


Genitourinary: No problems


Musculoskeletal: No problems


Neurological: No problems


Skin: No problems





Vital Signs





- Temperature


Temperature: 96.5 F


Temperature Source: Temporal Artery Scan





- Pulse


  ** Right


Pulse Rate: 105


Pulse Assessment Method: Pulse Oximetry





- Respirations


Respiratory Rate: 18


Oxygen Delivery Method: Room Air


O2 Sat by Pulse Oximetry: 97





- Blood Pressure


  ** Right Arm


Blood Pressure: 140/88


Blood Pressure Mean: 105


Blood Pressure Source: Automatic Cuff





Medical Screen Scoring





- Cervical Exam


Dilation (cm): 1


Station: -3


Membranes: Intact





- Uterine Contractions


Resting: Soft to palpation





- Fetal Assessment - Baby A


Baseline FHR: 135


Fetal Heart Rate - NICHD Category: Category I (Normal)


NST: Reactive





Physician Notification





- Physician Notified


Physician Notified Date: 22


Physician Notified Time: 19:06


Physician: Alma Deleon Order Received: Yes





- Notification Comment


Comment: RN reported to Dr. Deleon that pt is 1/thick/high, no blood noted on 

glove or.  externally. Cervix is posterior. RN to recheck cervix in an hour, if 

unchanged and pt.  has a reactive NST, pt may DC home.  RN reported to Dr. Deleon 

that pt's cervix is unchanged, no blood noted on glove.  Reactive NST, category 

1 FHTs. Pt is still feeling some cramping, however no cxns are.  traced and 

abdomen is soft. UA was WNL. RN is to discharge pt home with instructions to 

drink plenty.  of water, rest, take warm bath. Pt is scheduled to come in for 

her P C/S on 8/3. Pt to.  return with any worsenning symptoms. Discharge 

instructions given to pt and her .





Maternal Fetal Triage Index





- Maternal Fetal Triage Index


Presenting for scheduled procedure w/no complaint: No





- Stat/Priority 1


Stat Priority 1: No





- Urgent/Priority 2


Urgent Priority 2: Yes


Provider Notified: Alma Deleon


Provider Notified Time: 19:06


Criteria Met for Priority 2: >34wks, breech, planned c/s





Disposition





- Disposition


OB Disposition: Discharge to home


Discharge Date: 22


Discharge Time: 20:30


I agree with the RN Medical Screening Exam: Yes


Case reviewed; plan agreed upon as documented in EMR&OBIX.: Yes


Diagnosis: FALSE LABOR BEFORE 37 COMPLETED WEEKS OF GEST, THIRD TRI

## 2022-08-01 ENCOUNTER — HOSPITAL ENCOUNTER (OUTPATIENT)
Dept: HOSPITAL 47 - FBPOP | Age: 32
Discharge: HOME | End: 2022-08-01
Attending: OBSTETRICS & GYNECOLOGY
Payer: COMMERCIAL

## 2022-08-01 DIAGNOSIS — O13.9: Primary | ICD-10-CM

## 2022-08-01 DIAGNOSIS — Z3A.00: ICD-10-CM

## 2022-08-01 PROCEDURE — 59025 FETAL NON-STRESS TEST: CPT

## 2022-08-03 ENCOUNTER — HOSPITAL ENCOUNTER (INPATIENT)
Dept: HOSPITAL 47 - 4FBP | Age: 32
LOS: 2 days | Discharge: HOME | End: 2022-08-05
Attending: OBSTETRICS & GYNECOLOGY | Admitting: OBSTETRICS & GYNECOLOGY
Payer: COMMERCIAL

## 2022-08-03 DIAGNOSIS — Z30.02: ICD-10-CM

## 2022-08-03 DIAGNOSIS — O34.211: Primary | ICD-10-CM

## 2022-08-03 DIAGNOSIS — Z3A.37: ICD-10-CM

## 2022-08-03 DIAGNOSIS — O32.8XX0: ICD-10-CM

## 2022-08-03 DIAGNOSIS — Z87.19: ICD-10-CM

## 2022-08-03 DIAGNOSIS — Z88.5: ICD-10-CM

## 2022-08-03 DIAGNOSIS — Z98.84: ICD-10-CM

## 2022-08-03 DIAGNOSIS — Z30.2: ICD-10-CM

## 2022-08-03 DIAGNOSIS — Z90.49: ICD-10-CM

## 2022-08-03 DIAGNOSIS — Z79.82: ICD-10-CM

## 2022-08-03 DIAGNOSIS — Z88.8: ICD-10-CM

## 2022-08-03 DIAGNOSIS — L29.9: ICD-10-CM

## 2022-08-03 DIAGNOSIS — Z88.1: ICD-10-CM

## 2022-08-03 LAB
ALT SERPL-CCNC: 11 U/L (ref 4–34)
APTT BLD: 22.4 SEC (ref 22–30)
AST SERPL-CCNC: 17 U/L (ref 14–36)
BASOPHILS # BLD AUTO: 0 K/UL (ref 0–0.2)
BASOPHILS NFR BLD AUTO: 0 %
BUN SERPL-SCNC: 5 MG/DL (ref 7–17)
EOSINOPHIL # BLD AUTO: 0.1 K/UL (ref 0–0.7)
EOSINOPHIL NFR BLD AUTO: 1 %
ERYTHROCYTE [DISTWIDTH] IN BLOOD BY AUTOMATED COUNT: 4.02 M/UL (ref 3.8–5.4)
ERYTHROCYTE [DISTWIDTH] IN BLOOD: 16.1 % (ref 11.5–15.5)
HCT VFR BLD AUTO: 30.7 % (ref 34–46)
HGB BLD-MCNC: 9.7 GM/DL (ref 11.4–16)
INR PPP: 0.9 (ref ?–1.2)
LDH SPEC-CCNC: 414 U/L (ref 313–618)
LYMPHOCYTES # SPEC AUTO: 2.8 K/UL (ref 1–4.8)
LYMPHOCYTES NFR SPEC AUTO: 22 %
MCH RBC QN AUTO: 24.2 PG (ref 25–35)
MCHC RBC AUTO-ENTMCNC: 31.7 G/DL (ref 31–37)
MCV RBC AUTO: 76.3 FL (ref 80–100)
MONOCYTES # BLD AUTO: 0.6 K/UL (ref 0–1)
MONOCYTES NFR BLD AUTO: 5 %
NEUTROPHILS # BLD AUTO: 8.9 K/UL (ref 1.3–7.7)
NEUTROPHILS NFR BLD AUTO: 71 %
PH UR: 6.5 [PH] (ref 5–8)
PLATELET # BLD AUTO: 403 K/UL (ref 150–450)
PROT/CREAT UR-RTO: 0.17
PT BLD: 9.7 SEC (ref 9–12)
RBC UR QL: 3 /HPF (ref 0–5)
SP GR UR: 1.01 (ref 1–1.03)
SQUAMOUS UR QL AUTO: 8 /HPF (ref 0–4)
URATE SERPL-MCNC: 4.6 MG/DL (ref 3.7–7.4)
UROBILINOGEN UR QL STRIP: <2 MG/DL (ref ?–2)
WBC # BLD AUTO: 12.7 K/UL (ref 3.8–10.6)
WBC #/AREA URNS HPF: 7 /HPF (ref 0–5)

## 2022-08-03 PROCEDURE — 4A0HXCZ MEASUREMENT OF PRODUCTS OF CONCEPTION, CARDIAC RATE, EXTERNAL APPROACH: ICD-10-PCS

## 2022-08-03 PROCEDURE — 82565 ASSAY OF CREATININE: CPT

## 2022-08-03 PROCEDURE — 81001 URINALYSIS AUTO W/SCOPE: CPT

## 2022-08-03 PROCEDURE — 84156 ASSAY OF PROTEIN URINE: CPT

## 2022-08-03 PROCEDURE — 84450 TRANSFERASE (AST) (SGOT): CPT

## 2022-08-03 PROCEDURE — 86850 RBC ANTIBODY SCREEN: CPT

## 2022-08-03 PROCEDURE — 88307 TISSUE EXAM BY PATHOLOGIST: CPT

## 2022-08-03 PROCEDURE — 86900 BLOOD TYPING SEROLOGIC ABO: CPT

## 2022-08-03 PROCEDURE — 85610 PROTHROMBIN TIME: CPT

## 2022-08-03 PROCEDURE — 88302 TISSUE EXAM BY PATHOLOGIST: CPT

## 2022-08-03 PROCEDURE — 0UB50ZZ EXCISION OF RIGHT FALLOPIAN TUBE, OPEN APPROACH: ICD-10-PCS

## 2022-08-03 PROCEDURE — 84520 ASSAY OF UREA NITROGEN: CPT

## 2022-08-03 PROCEDURE — 86901 BLOOD TYPING SEROLOGIC RH(D): CPT

## 2022-08-03 PROCEDURE — 85730 THROMBOPLASTIN TIME PARTIAL: CPT

## 2022-08-03 PROCEDURE — 85025 COMPLETE CBC W/AUTO DIFF WBC: CPT

## 2022-08-03 PROCEDURE — 82570 ASSAY OF URINE CREATININE: CPT

## 2022-08-03 PROCEDURE — 84460 ALANINE AMINO (ALT) (SGPT): CPT

## 2022-08-03 PROCEDURE — 83615 LACTATE (LD) (LDH) ENZYME: CPT

## 2022-08-03 PROCEDURE — 84550 ASSAY OF BLOOD/URIC ACID: CPT

## 2022-08-03 RX ADMIN — POTASSIUM CHLORIDE SCH MLS/HR: 14.9 INJECTION, SOLUTION INTRAVENOUS at 06:27

## 2022-08-03 RX ADMIN — FAMOTIDINE SCH: 20 TABLET, FILM COATED ORAL at 10:05

## 2022-08-03 RX ADMIN — FAMOTIDINE SCH MG: 20 TABLET, FILM COATED ORAL at 19:45

## 2022-08-03 RX ADMIN — IBUPROFEN SCH: 600 TABLET ORAL at 16:58

## 2022-08-03 RX ADMIN — DOCUSATE SODIUM AND SENNOSIDES SCH EACH: 50; 8.6 TABLET ORAL at 19:45

## 2022-08-03 RX ADMIN — POTASSIUM CHLORIDE SCH: 14.9 INJECTION, SOLUTION INTRAVENOUS at 23:43

## 2022-08-03 RX ADMIN — LABETALOL HCL SCH: 200 TABLET, FILM COATED ORAL at 19:41

## 2022-08-03 RX ADMIN — LABETALOL HCL SCH MG: 200 TABLET, FILM COATED ORAL at 09:11

## 2022-08-03 RX ADMIN — ACETAMINOPHEN SCH: 10 INJECTION, SOLUTION INTRAVENOUS at 19:19

## 2022-08-03 RX ADMIN — IBUPROFEN SCH: 600 TABLET ORAL at 19:40

## 2022-08-03 RX ADMIN — ACETAMINOPHEN SCH MLS/HR: 10 INJECTION, SOLUTION INTRAVENOUS at 12:59

## 2022-08-03 RX ADMIN — LABETALOL HCL SCH: 200 TABLET, FILM COATED ORAL at 10:05

## 2022-08-03 RX ADMIN — ACETAMINOPHEN SCH MG: 500 TABLET ORAL at 18:51

## 2022-08-03 RX ADMIN — KETOROLAC TROMETHAMINE SCH MG: 15 INJECTION, SOLUTION INTRAMUSCULAR; INTRAVENOUS at 23:41

## 2022-08-03 RX ADMIN — ACETAMINOPHEN SCH: 500 TABLET ORAL at 15:26

## 2022-08-03 RX ADMIN — POTASSIUM CHLORIDE SCH MLS/HR: 14.9 INJECTION, SOLUTION INTRAVENOUS at 18:52

## 2022-08-03 RX ADMIN — FAMOTIDINE SCH MG: 20 TABLET, FILM COATED ORAL at 09:11

## 2022-08-03 RX ADMIN — LABETALOL HCL SCH: 200 TABLET, FILM COATED ORAL at 18:23

## 2022-08-03 RX ADMIN — KETOROLAC TROMETHAMINE SCH MG: 15 INJECTION, SOLUTION INTRAMUSCULAR; INTRAVENOUS at 16:22

## 2022-08-03 NOTE — P.OP
Date of Procedure: 22


Preoperative Diagnosis: 


1.  Intrauterine pregnancy at 37 and one sevenths weeks.


2.  Breech presentation.


3.  Gestational hypertension with preeclampsia.


4.  Family-planning.





Postoperative Diagnosis: 


Same


Procedure(s) Performed: 


Primary low transverse  section with right partial salpingectomy


Anesthesia: spinal (Duramorph)


Surgeon: Alma Deleon


Assistant #1: Mehul Cazares


Estimated Blood Loss (ml): 500


Pathology: other (Placenta, portion of right fallopian tube)


Condition: stable


Disposition: floor


Indications for Procedure: 


This is a 32-year-old female  3 para 1 at 37 and one sevenths weeks with 

baby in breech presentation and pregnancy complicated by gestational 

hypertension with superimposed preeclampsia.  Please see history and physical 

for details of admission.  She has consented to primary  section with 

right partial salpingectomy.  She has a previous left salpingectomy due to 

ectopic pregnancy.





I have discussed the risks, benefits, and alternative therapies for the above-

mentioned procedure and for both sedation/anesthesia as well as necessary blood 

products administration, if indicated, as they pertain to this patient.  The 

patient has indicated her understanding and acceptance of the risks and procedu

res discussed.








Operative Findings: 


A viable male infant is noted in the footling breech presentation with Apgar 

scores of 8 at 1 minute and 9 at 5 minutes and infant weight of 7 lbs. 5 oz.  

Normal right fallopian tube is noted.  Left fallopian tube is previously 

removed.  Both ovaries appear normal.


Description of Procedure: 


The patient is taken to the operating room where she is placed in the dorsal 

supine position with leftward tilt after spinal Duramorph anesthesia is given.  

She is prepped and draped in the normal sterile fashion.  Abdominal retractor is

placed on the patient's upper abdomen and attached to the bed by anesthesia for 

retraction of her panniculus.  Skin was tested and found to be adequately 

anesthetized.  A Pfannenstiel skin incision was made with a scalpel.  A second 

knife was used to carry the incision down to the underlying layer of fascia.  

The fascia was nicked in the midline with a scalpel and then extended laterally 

bilaterally with Bautista scissors.  The anterior lip of the fascia was grasped with

2 Kocher clamps and then dissected off the underlying rectus muscle in the 

midline with Bautista scissors.  The inferior aspect of the fascial incision was 

grasped with 2 Kocher clamps and dissected off the underlying rectus muscle and 

the midline with Bautista scissors.  Next the peritoneum layer was tented up with 2 

hemostats and then entered sharply with the scalpel.  The incision is extended 

superiorly and inferiorly with Metzenbaum scissors.  Next a DeLee retractor is 

placed.  The vesicouterine peritoneum is entered sharply with Metzenbaum 

scissors and extended laterally bilaterally with Metzenbaum scissors and then 

the bladder flap is pushed inferiorly.  The lower uterine segment is incised in 

transverse fashion with the scalpel and then bluntly entered with a hemostat.  

Clear fluid is noted.  The incision was then extended laterally bilaterally with

2 fingers.  Next the infant's feet are  delivered through the incision followed 

by the buttocks and trunk, followed by each arm in a flexed position, followed 

by the head in a flexed position.  Nose and mouth are bulb suctioned. Cord is 

clamped and cut.  Infant is taken to warmer by nursing staff.  Uterine fundus is

gently massaged and placenta is delivered manually.  Uterus is exteriorized and 

cleared of all clots and debris.  Uterine incision is closed with 0 Vicryl 

suture in a running locked fashion.  A second layer of 0 Vicryl suture is used 

in a running fashion for hemostasis.  Once adequate hemostasis as assured, the 

vesicouterine peritoneum is reapproximated with 2-0 Vicryl suture in a running 

fashion.  The right fallopian tube is grasped in the midportion and then the 

mesosalpinx is entered with Bovie cautery.  0 Vicryl suture is tied 2 times 

around both the proximal and distal portion of the tube and the knuckle of tube 

is cut with Metzenbaum scissors.  The portion of tube is removed from the field 

and then the ends of the tubes are cauterized with Bovie cautery.  Good 

hemostasis is noted.  Posterior cul-de-sac is suctioned of all clots and debris.

 Uterus is returned to the abdomen.  Incision is noted to be hemostatic.  Right 

fallopian tube segment is also noted to be hemostatic.  Peritoneal layer is 

closed with 0 Vicryl suture in a running fashion.  Muscle layer is 

reapproximated with 0 Vicryl suture in interrupted fashion.  Fascia layer is 

then closed with 0 PDS suture with 2 sutures meeting in the midline and the 

knots buried in either side and in the midline.  The subcutaneous tissue was 

then closed with 2-0 Vicryl suture.  Skin layer was then closed with staples.  

All sponge and needle counts are correct.  The patient is taken to recovery room

in stable condition.

## 2022-08-04 LAB
BASOPHILS # BLD AUTO: 0 K/UL (ref 0–0.2)
BASOPHILS NFR BLD AUTO: 0 %
EOSINOPHIL # BLD AUTO: 0.1 K/UL (ref 0–0.7)
EOSINOPHIL NFR BLD AUTO: 1 %
ERYTHROCYTE [DISTWIDTH] IN BLOOD BY AUTOMATED COUNT: 3.44 M/UL (ref 3.8–5.4)
ERYTHROCYTE [DISTWIDTH] IN BLOOD: 16.4 % (ref 11.5–15.5)
HCT VFR BLD AUTO: 26.8 % (ref 34–46)
HGB BLD-MCNC: 8.4 GM/DL (ref 11.4–16)
LYMPHOCYTES # SPEC AUTO: 2 K/UL (ref 1–4.8)
LYMPHOCYTES NFR SPEC AUTO: 18 %
MCH RBC QN AUTO: 24.3 PG (ref 25–35)
MCHC RBC AUTO-ENTMCNC: 31.2 G/DL (ref 31–37)
MCV RBC AUTO: 77.8 FL (ref 80–100)
MONOCYTES # BLD AUTO: 0.6 K/UL (ref 0–1)
MONOCYTES NFR BLD AUTO: 5 %
NEUTROPHILS # BLD AUTO: 8.2 K/UL (ref 1.3–7.7)
NEUTROPHILS NFR BLD AUTO: 75 %
PLATELET # BLD AUTO: 300 K/UL (ref 150–450)
WBC # BLD AUTO: 11 K/UL (ref 3.8–10.6)

## 2022-08-04 RX ADMIN — LABETALOL HCL SCH: 200 TABLET, FILM COATED ORAL at 19:52

## 2022-08-04 RX ADMIN — ACETAMINOPHEN SCH MG: 500 TABLET ORAL at 07:35

## 2022-08-04 RX ADMIN — FAMOTIDINE SCH MG: 20 TABLET, FILM COATED ORAL at 07:35

## 2022-08-04 RX ADMIN — IBUPROFEN SCH: 600 TABLET ORAL at 05:04

## 2022-08-04 RX ADMIN — KETOROLAC TROMETHAMINE SCH: 15 INJECTION, SOLUTION INTRAMUSCULAR; INTRAVENOUS at 15:27

## 2022-08-04 RX ADMIN — ACETAMINOPHEN SCH: 500 TABLET ORAL at 05:04

## 2022-08-04 RX ADMIN — DOCUSATE SODIUM AND SENNOSIDES SCH: 50; 8.6 TABLET ORAL at 10:52

## 2022-08-04 RX ADMIN — IBUPROFEN SCH MG: 600 TABLET ORAL at 22:37

## 2022-08-04 RX ADMIN — KETOROLAC TROMETHAMINE SCH: 15 INJECTION, SOLUTION INTRAMUSCULAR; INTRAVENOUS at 06:53

## 2022-08-04 RX ADMIN — POTASSIUM CHLORIDE SCH: 14.9 INJECTION, SOLUTION INTRAVENOUS at 06:53

## 2022-08-04 RX ADMIN — IBUPROFEN SCH MG: 600 TABLET ORAL at 11:00

## 2022-08-04 RX ADMIN — LABETALOL HCL SCH: 200 TABLET, FILM COATED ORAL at 17:30

## 2022-08-04 RX ADMIN — DOCUSATE SODIUM AND SENNOSIDES SCH EACH: 50; 8.6 TABLET ORAL at 19:53

## 2022-08-04 RX ADMIN — ACETAMINOPHEN SCH: 500 TABLET ORAL at 06:53

## 2022-08-04 RX ADMIN — POTASSIUM CHLORIDE SCH: 14.9 INJECTION, SOLUTION INTRAVENOUS at 15:35

## 2022-08-04 RX ADMIN — IBUPROFEN SCH MG: 600 TABLET ORAL at 14:40

## 2022-08-04 RX ADMIN — ACETAMINOPHEN SCH MG: 500 TABLET ORAL at 17:51

## 2022-08-04 RX ADMIN — LABETALOL HCL SCH: 200 TABLET, FILM COATED ORAL at 10:54

## 2022-08-04 RX ADMIN — FAMOTIDINE SCH MG: 20 TABLET, FILM COATED ORAL at 17:52

## 2022-08-04 NOTE — P.PN
Progress Note - Text


Date: 2022 Time: 07:21


The patient is status post  section


Vital signs stable


VAS: 0-10


Patient has no complaints of pain.  The patient incurred some minimal itching 

yesterday, this itching is  now subsiding.


Pain meds to be managed by service.

## 2022-08-04 NOTE — P.PNOBGPC
Subjective





- Subjective


Principal diagnosis: Status post  section with tubal postoperative day 

#1


Interval history: 





Patient is doing well.  She is passing flatus but no bowel movement yet.  Pain 

is well-controlled.  Lochia is minimal.  She is working on breast-feeding.


Patient reports: Reports appetite normal, Reports voiding normally, Reports pain

well controlled, Reports ambulating normally


: doing well





Objective





- Vital Signs


Latest vital signs: 


                                   Vital Signs











  Temp Pulse Resp BP Pulse Ox


 


 22 07:43  97.6 F  82  17  122/70  99


 


 22 07:00    16  


 


 22 04:00  97.9 F  84  16  117/59  99


 


 22 00:00  97.8 F  73  18  129/67  96


 


 22 20:00  98.5 F  86  18  118/75  97


 


 22 17:00    17  


 


 22 16:00  98.6 F  81  17  112/71  98


 


 22 15:00    17  


 


 22 13:00    16  


 


 22 11:27  97.0 F L  75  16  107/52 


 


 22 11:04      96


 


 22 11:00    17  


 


 22 10:52  97.0 F L  67  17  110/65  95


 


 22 10:22   80  16  106/57  93 L


 


 22 09:52  96.9 F L  73  16  110/61  98


 


 22 09:37   84  16   92 L


 


 22 09:22   80  16  118/62  97


 


 22 09:07   93  18  117/60 


 


 22 09:02    17  








                                Intake and Output











 22





 22:59 06:59 14:59


 


Output Total 225 200 


 


Balance -225 -200 


 


Output:   


 


  Urine 225 200 


 


Other:   


 


  Voiding Method Indwelling Catheter  


 


  # Voids 1 1 














- Exam


Extremities: Present: normal.  Absent: tenderness


Abdomen: Present: normal appearance, soft (Positive bowel sounds 4).  Absent: 

distention, tenderness


Incision: Present: normal, dry, intact.  Absent: erythematous


Uterus: Present: normal, firm.  Absent: tenderness





- Labs


Labs: 


                  Abnormal Lab Results - Last 24 Hours (Table)











  22 Range/Units





  06:29 


 


WBC  11.0 H  (3.8-10.6)  k/uL


 


RBC  3.44 L  (3.80-5.40)  m/uL


 


Hgb  8.4 L  (11.4-16.0)  gm/dL


 


Hct  26.8 L  (34.0-46.0)  %


 


MCV  77.8 L  (80.0-100.0)  fL


 


MCH  24.3 L  (25.0-35.0)  pg


 


RDW  16.4 H  (11.5-15.5)  %


 


Neutrophils #  8.2 H  (1.3-7.7)  k/uL














Assessment and Plan


Assessment: 





Status post primary low transverse  section with right partial 

salpingectomy postoperative day #1


(1) 37 weeks gestation of pregnancy


Current Visit: No   Status: Acute   Code(s): Z3A.37 - 37 WEEKS GESTATION OF 

PREGNANCY   SNOMED Code(s): 88395966


   





(2) Preeclampsia


Current Visit: No   Status: Acute   Code(s): O14.90 - UNSPECIFIED PRE-ECLAMPSIA,

UNSPECIFIED TRIMESTER   SNOMED Code(s): 813084306


   





(3) Gestational hypertension


Current Visit: No   Status: Acute   Code(s): O13.9 - GESTATIONAL HTN W/O 

SIGNIFICANT PROTEINURIA, UNSP TRIMESTER   SNOMED Code(s): 68117461


   


Plan: 





Continue with postoperative and postpartum care today.  Blood pressure 

medications have been held since her blood pressure has been lower.  She is 

advised this may increase a little bit today once her spinal medication has worn

off.  Diet as tolerated.

## 2022-08-05 VITALS
TEMPERATURE: 97.8 F | SYSTOLIC BLOOD PRESSURE: 143 MMHG | RESPIRATION RATE: 18 BRPM | DIASTOLIC BLOOD PRESSURE: 79 MMHG | HEART RATE: 74 BPM

## 2022-08-05 RX ADMIN — DOCUSATE SODIUM AND SENNOSIDES SCH EACH: 50; 8.6 TABLET ORAL at 07:49

## 2022-08-05 RX ADMIN — ACETAMINOPHEN SCH MG: 500 TABLET ORAL at 06:43

## 2022-08-05 RX ADMIN — FAMOTIDINE SCH MG: 20 TABLET, FILM COATED ORAL at 07:49

## 2022-08-05 RX ADMIN — IBUPROFEN SCH MG: 600 TABLET ORAL at 04:23

## 2022-08-05 RX ADMIN — ACETAMINOPHEN SCH MG: 500 TABLET ORAL at 00:09

## 2022-08-05 RX ADMIN — LABETALOL HCL SCH MG: 200 TABLET, FILM COATED ORAL at 08:03

## 2022-08-05 NOTE — P.DS
Providers


Date of admission: 


22 05:47





Expected date of discharge: 22


Attending physician: 


Alma Deleon





Primary care physician: 


Stated None








- Discharge Diagnosis(es)


(1) 37 weeks gestation of pregnancy


Current Visit: No   Status: Acute   





(2) Preeclampsia


Current Visit: No   Status: Acute   





(3) Gestational hypertension


Current Visit: No   Status: Acute   


Hospital Course: 





This is a 32-year-old female  3 para 1 at 37 and one sevenths weeks who 

presented for scheduled  section due to gestational hypertension and 

preeclampsia.  She underwent a primary low transverse  section with 

right partial salpingectomy on 3 2022 and delivered a viable male infant with 

Apgar scores of 8 at 1 minute and 9 at 5 minutes and infant weight of 7 lbs. 5 

oz.  Her postpartum course had been uncomplicated.  Her blood pressures 

initially were low but then did increase to the point she needed to resume her 

labetalol.  There are no blood pressures and severe category at this time.  She 

denies any headaches or blurry vision.  She is passing flatus and bowel 

movement.  She is urinating without difficulty.  She is breast-feeding.  Vital 

signs are stable.  Abdomen is soft with positive bowel sounds 4.  Incision is 

clean dry and intact with staples in place.  Extremities show negative Homans.  

Impression is status post primary low transverse  section with right 

partial salpingectomy postoperative day #2.  Plan is to discharge home later 

today.  Routine postoperative and postpartum instructions are given.  She will 

continue to check her blood pressures at home.  She is continuing on her 

labetalol.  She is advised to follow up in the office in 1-2 weeks for a 

postoperative check and in 6 weeks for a postpartum check.  She is advised to 

call the office if she has any further questions or concerns prior to her 

appointment time.  Staples will be removed and Steri-Strips placed prior to 

discharge.


Procedures: 





Primary low transverse  section with right partial salpingectomy on 

2022


Patient Condition at Discharge: Stable





Plan - Discharge Summary


New Discharge Prescriptions: 


New


   Ibuprofen [Motrin] 600 mg PO Q6H #60 tab





Continue


   Labetalol [Trandate] 200 mg PO TID


   Famotidine [Pepcid] 20 mg PO BID


   Prenatal Vit No.179/Iron/Folic [Prenatal Tablet] 1 tab PO DAILY





Discontinued


   Aspirin 81 mg PO DAILY


Discharge Medication List





Labetalol [Trandate] 200 mg PO TID 22 [History]


Prenatal Vit No.179/Iron/Folic [Prenatal Tablet] 1 tab PO DAILY 22 

[History]


Famotidine [Pepcid] 20 mg PO BID 22 [History]


Ibuprofen [Motrin] 600 mg PO Q6H #60 tab 22 [Rx]








Follow up Appointment(s)/Referral(s): 


Alma Deleon DO [Doctor of Osteopathic Medicine] - 22 11:30 am (Post op 

appointment 8/15/22 @1:30)


Activity/Diet/Wound Care/Special Instructions: 


Postpartum Instructions





1.  Do not begin any exercise program for 3 weeks.


2.  Do not resume sexual relations for 3 weeks or longer if uncomfortable.


3.  You may take tub baths or showers at any time.


4.  You may use tampons if desired after 3 weeks.


5.  Keep the area of episiotomy (stitches) clean and dry.


6.  If you are not nursing, wear a good fitting, supportive bra during the day 

and limit fluid intake for at least 1 week to prevent breast engorgement.


7.  Call the office, [phone], within the next week to make appointment for your

6 week checkup if it has not already been made.


8.  Report any of the following occurrences to the doctor promptly:


     a.  Heavy, excessive bleeding


     b.  Chills, fever


     c.  Burning or frequency of urination


     d.  Pain or redness and breasts if nursing


     e.  Increasing pain or swelling in episiotomy (stitches).











In addition to the above instructions, the following additional should be 

followed:


1.  No heavy lifting or straining (exercising) until after 6 week checkup.


2.  Keep abdominal incision clean and dry: You may wear a dressing if more co

mfortable.


3.  Make office appointment for 10 days after going home or as instructed by her

doctor.


Discharge Disposition: HOME SELF-CARE

## 2022-12-07 ENCOUNTER — HOSPITAL ENCOUNTER (EMERGENCY)
Dept: HOSPITAL 47 - EC | Age: 32
Discharge: HOME | End: 2022-12-07
Payer: COMMERCIAL

## 2022-12-07 VITALS
TEMPERATURE: 98.1 F | HEART RATE: 73 BPM | RESPIRATION RATE: 18 BRPM | DIASTOLIC BLOOD PRESSURE: 91 MMHG | SYSTOLIC BLOOD PRESSURE: 147 MMHG

## 2022-12-07 DIAGNOSIS — Z88.5: ICD-10-CM

## 2022-12-07 DIAGNOSIS — H66.91: Primary | ICD-10-CM

## 2022-12-07 DIAGNOSIS — I10: ICD-10-CM

## 2022-12-07 DIAGNOSIS — Z88.1: ICD-10-CM

## 2022-12-07 DIAGNOSIS — Z88.0: ICD-10-CM

## 2022-12-07 DIAGNOSIS — Z88.6: ICD-10-CM

## 2022-12-07 PROCEDURE — 99282 EMERGENCY DEPT VISIT SF MDM: CPT

## 2022-12-07 NOTE — ED
ENT HPI





- General


Chief complaint: ENT


Stated complaint: Ear Pain


Time Seen by Provider: 12/07/22 06:58


Source: patient, RN notes reviewed, old records reviewed


Mode of arrival: ambulatory


Limitations: no limitations





- History of Present Illness


Initial comments: 





32-year-old female presents to the emergency room with 1 day of right ear pain 

and congestion.  States does have pain with pressure going into her neck and 

worse when she opens her mouth.  Denies any trauma.  No discharge.  Denies any 

fevers, no systemic signs.  


MD complaint: ear pain


-: days(s) (1)


Location: R ear


Severity scale (1-10): 9


Quality: aching, constant


Consistency: constant


Improves with: none


Worsens with: other (jaw movement)


Associated Symptoms: other (congestion)





- Related Data


                                Home Medications











 Medication  Instructions  Recorded  Confirmed


 


Labetalol [Trandate] 200 mg PO TID 05/19/22 08/03/22


 


Prenatal Vit No.179/Iron/Folic 1 tab PO DAILY 07/01/22 08/03/22





[Prenatal Tablet]   


 


Famotidine [Pepcid] 20 mg PO BID 07/07/22 08/03/22








                                  Previous Rx's











 Medication  Instructions  Recorded


 


Ibuprofen [Motrin] 600 mg PO Q6H #60 tab 08/05/22


 


Azithromycin [Zithromax] 500 mg PO DAILY #5 tab 12/07/22











                                    Allergies











Allergy/AdvReac Type Severity Reaction Status Date / Time


 


amoxicillin Allergy  Anaphylaxis Verified 12/07/22 06:51


 


codeine Allergy  Unknown Verified 12/07/22 06:51


 


hydromorphone [From Dilaudid] Allergy  Chest Pain Verified 12/07/22 06:51


 


nitrofurantoin Allergy  Anaphylaxis Verified 12/07/22 06:51





[From Macrobid]     


 


morphine AdvReac  Unknown Verified 12/07/22 06:51














Review of Systems


ROS Statement: 


Those systems with pertinent positive or pertinent negative responses have been 

documented in the HPI.





ROS Other: All systems not noted in ROS Statement are negative.





Past Medical History


Past Medical History: Hypertension


Additional Past Medical History / Comment(s): Hernia, ectopic pregnancy,


History of Any Multi-Drug Resistant Organisms: None Reported


Past Surgical History: Adenoidectomy, Bariatric Surgery, Cholecystectomy, 

Orthopedic Surgery, Tonsillectomy


Additional Past Surgical History / Comment(s): Laparoscopic L. salpingectomy due

to ectopic


Past Anesthesia/Blood Transfusion Reactions: No Reported Reaction


Past Psychological History: No Psychological Hx Reported


Smoking Status: Never smoker


Past Alcohol Use History: None Reported


Past Drug Use History: None Reported





- Past Family History


  ** Mother


Family Medical History: No Reported History





  ** Father


Family Medical History: No Reported History





General Exam


Limitations: no limitations


General appearance: alert, in no apparent distress


Head exam: Present: atraumatic


Eye exam: Present: normal appearance.  Absent: scleral icterus, conjunctival 

injection, periorbital swelling, periorbital tenderness


ENT exam: Present: normal exam, mucous membranes moist


  ** Expanded


Ear exam: Present: normal external inspection.  Absent: auricular hematoma, 

auricular trauma


TM/Canal exam: Erythema: Right TM, Bulging: Right TM, Loss of Landmarks: Right 

TM


Respiratory exam: Absent: respiratory distress, accessory muscle use


Cardiovascular Exam: Present: regular rate


Neurological exam: Present: alert, oriented X3, normal gait


Psychiatric exam: Present: normal affect, normal mood


Skin exam: Present: warm, dry, normal color.  Absent: cyanosis, diaphoretic, 

petechiae, pallor





Course


                                   Vital Signs











  12/07/22





  06:51


 


Temperature 98.1 F


 


Pulse Rate 73


 


Respiratory 18





Rate 


 


Blood Pressure 147/91


 


O2 Sat by Pulse 98





Oximetry 














Medical Decision Making





- Medical Decision Making





Due to the patient's ALLERGY to amoxicillin she will be give a Z-pack for otitis

media.  She was also directed to continue ALLERGY medications daily.  Follow-up 

with her primary care doctor this week.  Case discussed with Dr. Sharma. 





Disposition


Clinical Impression: 


 Otitis media





Disposition: HOME SELF-CARE


Condition: Good


Instructions (If sedation given, give patient instructions):  Earache (ED)


Additional Instructions: 


Take ALLERGY medication and the antibiotics as prescribed. Follow up with the 

primary care doctor this week.  Return to the emergency room with any new or 

concerning symptoms.


Prescriptions: 


Azithromycin [Zithromax] 500 mg PO DAILY #5 tab


Is patient prescribed a controlled substance at d/c from ED?: No


Referrals: 


None,Stated [Primary Care Provider] - 1-2 days


Time of Disposition: 07:17

## 2023-08-14 NOTE — P.HPOB
History of Present Illness


H&P Date: 21


Chief Complaint: Abdominal pain, pregnancy





This is a 31-year-old female  2 para 1 with an last menstrual period 

started  and lasted until  who presented to the emergency 

room with complaints of bilateral lower abdominal pain that started suddenly at 

around noon time today.  She states she was driving her car and felt like she 

had to have a bowel movement.  When she went into the house and sat on the 

toilet she was unable to have a bowel movement but just felt cramping. She came 

into the emergency room and was given 1 dose of Toradol which did not seem to 

help her pain very much.  She was then given morphine.  Shortly after the 

morphine was given her lower abdominal pain resolved but she started having 

severe right upper quadrant pain under her ribs that extended into her right 

chest and right back.  She states it makes it hard to take a deep breath.  She 

was also having some dry heaving and nausea around this time.  When the pain 

comes on, she gets a hot feeling that comes in waves and only lasts a few 

seconds.  She denies any current vaginal bleeding.  The patient was unaware that

she was pregnant until she came into the emergency room.  She states that she 

has not been preventing pregnancy but has not actively been trying to get 

pregnant.  Uultrasound in the emergency room showed a normal-size uterus with an

endometrium of 0.7 cm and some fluid in the cul-de-sac measuring approximately 

1.3 cm with a volume of approximately 2.3 mL, with left ovary not visualized.  

Her beta hCG level was 3100.  She denies any history of pulmonary embolism or 

blood clotting disorder.  She states she was told that she had a hernia when she

had an endoscopy but is unsure where the hernia is.  She denies any cardiac 

issues.  The patient states that in the past when she has received morphine, she

gets a migraine headache from it.  Dilaudid does give her chest pain but nothing

like the pain that she is currently experiencing.  Codeine also gives her a 

migraine.





Obstetrical history: .  History of 1 vaginal delivery at term.  Her d

aughter is 6 years old.  No complications during that pregnancy.


Gynecologic history: No history of sexual transmitted diseases.  Her last Pap 

smear was in 2019 and was normal.


Social Hx:  She is .  She is a stay-at-home mom.





Review of Systems


Constitutional: Denies chills, Denies fever


Eyes: denies blurred vision, denies pain


Ears, nose, mouth and throat: Denies headache, Denies sore throat


Cardiovascular: Reports chest pain (right-sided)


Respiratory: Denies cough


Gastrointestinal: Reports abdominal pain, Reports nausea, Denies change in bowel

habits, Denies constipation, Denies diarrhea, Denies vomiting


Genitourinary: Reports pelvic pain, Reports pregnant, Denies abnormal vaginal 

bleeding


Menstruation: Reports period normal


Musculoskeletal: Denies myalgias


Integumentary: Denies pruritus, Denies rash


Neurological: Denies numbness, Denies weakness


Psychiatric: Denies anxiety, Denies depression





Past Medical History


Past Medical History: No Reported History


Past Surgical History: Adenoidectomy, Bariatric Surgery (gastric sleeve), 

Cholecystectomy, Orthopedic Surgery (left ankle surgery, reconstruction on her 

left ring finger at 2-1/2 years old), Tonsillectomy


Past Anesthesia/Blood Transfusion Reactions: No Reported Reaction


Past Psychological History: No Psychological Hx Reported





Medications and Allergies


                                Home Medications











 Medication  Instructions  Recorded  Confirmed  Type


 


No Known Home Medications  21 History








                                    Allergies











Allergy/AdvReac Type Severity Reaction Status Date / Time


 


codeine Allergy  Unknown Verified 21 14:35


 


hydromorphone [From Dilaudid] Allergy  Chest Pain Verified 21 14:35


 


nitrofurantoin Allergy  Anaphylaxis Verified 21 14:35





[From Macrobid]     














Exam


Osteopathic Statement: *.  No significant issues noted on an osteopathic 

structural exam other than those noted in the History and Physical/Consult.


                                   Vital Signs











  Temp Pulse Pulse Resp BP BP Pulse Ox


 


 21 23:30    62  18   109/70  97


 


 21 22:10  98 F   87  18   135/83  98


 


 21 21:48   90   18    98


 


 21 17:16   95   18  108/78   98


 


 21 12:24  97.7 F  83   20  135/93   100








                                Intake and Output











 21





 14:59 22:59 06:59


 


Other:   


 


  # Voids  1 


 


  Weight 181.437 kg  














Gen.: Obese female who initially appears to be in moderate distress having to 

sit up and leaning forward in order to talk


Lungs: Clear to auscultation bilaterally


Heart: Regular rate and rhythm


Abdomen: Soft, positive bowel sounds 4, tender in the right upper quadrant just

underneath her rib cage extending to her right chest and right upper back and 

along her right mid abdomen


Pelvic exam: Uterus is slightly enlarged, nontender, with no adnexal tenderness.

 Difficult to completely feel adnexa due to patient's size.  No vaginal bleeding

is noted


Extremities: Negative Homans





Results


Result Diagrams: 


                                 21 20:11





                                 21 12:31


                  Abnormal Lab Results - Last 24 Hours (Table)











  21 Range/Units





  12:31 20:11 22:44 


 


WBC   15.3 H   (3.8-10.6)  k/uL


 


Neutrophils #   11.9 H   (1.3-7.7)  k/uL


 


D-Dimer    1.25 H  (<0.60)  mg/L FEU


 


Sodium  136 L    (137-145)  mmol/L


 


Glucose  107 H    (74-99)  mg/dL














Assessment and Plan


Assessment: 





Abdominal pain with positive pregnancy test, possible early IUP versus ectopic 

pregnancy


Plan: 





Admission for observation and serial beta hCG and CBC levels.  We'll keep 

nothing by mouth until beta hCG levels return in the morning.  We'll continue 

close observation.  Cardiology and internal medicine consults were obtained due 

to chest pain.  Pleuritic-type chest pain has resolved since giving Toradol and 

Flexeril.
warm

## 2025-03-09 ENCOUNTER — HOSPITAL ENCOUNTER (EMERGENCY)
Dept: HOSPITAL 47 - EC | Age: 35
Discharge: HOME | End: 2025-03-09
Payer: COMMERCIAL

## 2025-03-09 VITALS — DIASTOLIC BLOOD PRESSURE: 92 MMHG | HEART RATE: 78 BPM | SYSTOLIC BLOOD PRESSURE: 141 MMHG | TEMPERATURE: 98.2 F

## 2025-03-09 VITALS — RESPIRATION RATE: 8 BRPM

## 2025-03-09 DIAGNOSIS — Z98.84: ICD-10-CM

## 2025-03-09 DIAGNOSIS — R14.1: Primary | ICD-10-CM

## 2025-03-09 LAB
ALBUMIN SERPL-MCNC: 3.8 G/DL (ref 3.5–5)
ALP SERPL-CCNC: 57 U/L (ref 38–126)
ALT SERPL-CCNC: 24 U/L (ref 4–34)
AMYLASE SERPL-CCNC: 41 U/L (ref 30–110)
ANION GAP SERPL CALC-SCNC: 7 MMOL/L
APTT BLD: 26.8 SEC (ref 22–30)
AST SERPL-CCNC: 29 U/L (ref 14–36)
BASOPHILS # BLD AUTO: 0 K/UL (ref 0–0.2)
BASOPHILS NFR BLD AUTO: 1 %
BUN SERPL-SCNC: 7 MG/DL (ref 7–17)
CALCIUM SPEC-MCNC: 9.3 MG/DL (ref 8.4–10.2)
CHLORIDE SERPL-SCNC: 102 MMOL/L (ref 98–107)
CO2 SERPL-SCNC: 29 MMOL/L (ref 22–30)
EOSINOPHIL # BLD AUTO: 0.2 K/UL (ref 0–0.7)
EOSINOPHIL NFR BLD AUTO: 2 %
ERYTHROCYTE [DISTWIDTH] IN BLOOD BY AUTOMATED COUNT: 5 M/UL (ref 3.8–5.4)
ERYTHROCYTE [DISTWIDTH] IN BLOOD: 15.5 % (ref 11.5–15.5)
GLUCOSE SERPL-MCNC: 81 MG/DL (ref 74–99)
HCT VFR BLD AUTO: 40.5 % (ref 34–46)
HGB BLD-MCNC: 12.8 GM/DL (ref 11.4–16)
INR PPP: 1 (ref ?–1.2)
LIPASE SERPL-CCNC: 90 U/L (ref 23–300)
LYMPHOCYTES # SPEC AUTO: 2.7 K/UL (ref 1–4.8)
LYMPHOCYTES NFR SPEC AUTO: 33 %
MCH RBC QN AUTO: 25.5 PG (ref 25–35)
MCHC RBC AUTO-ENTMCNC: 31.5 G/DL (ref 31–37)
MCV RBC AUTO: 81 FL (ref 80–100)
MONOCYTES # BLD AUTO: 0.4 K/UL (ref 0–1)
MONOCYTES NFR BLD AUTO: 5 %
NEUTROPHILS # BLD AUTO: 4.6 K/UL (ref 1.3–7.7)
NEUTROPHILS NFR BLD AUTO: 57 %
PH UR: 6 [PH] (ref 5–8)
PLATELET # BLD AUTO: 294 K/UL (ref 150–450)
POTASSIUM SERPL-SCNC: 3.8 MMOL/L (ref 3.5–5.1)
PROT SERPL-MCNC: 6.4 G/DL (ref 6.3–8.2)
PT BLD: 11.2 SEC (ref 10–12.5)
RBC UR QL: 3 /HPF (ref 0–5)
SODIUM SERPL-SCNC: 138 MMOL/L (ref 137–145)
SP GR UR: 1.03 (ref 1–1.03)
SQUAMOUS UR QL AUTO: 22 /HPF (ref 0–4)
UROBILINOGEN UR QL STRIP: 4 MG/DL (ref ?–2)
WBC # BLD AUTO: 8 K/UL (ref 3.8–10.6)
WBC # UR AUTO: 7 /HPF (ref 0–5)

## 2025-03-09 PROCEDURE — 83690 ASSAY OF LIPASE: CPT

## 2025-03-09 PROCEDURE — 96360 HYDRATION IV INFUSION INIT: CPT

## 2025-03-09 PROCEDURE — 85610 PROTHROMBIN TIME: CPT

## 2025-03-09 PROCEDURE — 85025 COMPLETE CBC W/AUTO DIFF WBC: CPT

## 2025-03-09 PROCEDURE — 85730 THROMBOPLASTIN TIME PARTIAL: CPT

## 2025-03-09 PROCEDURE — 80053 COMPREHEN METABOLIC PANEL: CPT

## 2025-03-09 PROCEDURE — 84484 ASSAY OF TROPONIN QUANT: CPT

## 2025-03-09 PROCEDURE — 99285 EMERGENCY DEPT VISIT HI MDM: CPT

## 2025-03-09 PROCEDURE — 83605 ASSAY OF LACTIC ACID: CPT

## 2025-03-09 PROCEDURE — 93005 ELECTROCARDIOGRAM TRACING: CPT

## 2025-03-09 PROCEDURE — 81001 URINALYSIS AUTO W/SCOPE: CPT

## 2025-03-09 PROCEDURE — 82150 ASSAY OF AMYLASE: CPT

## 2025-03-09 PROCEDURE — 36415 COLL VENOUS BLD VENIPUNCTURE: CPT

## 2025-03-09 PROCEDURE — 74177 CT ABD & PELVIS W/CONTRAST: CPT

## 2025-03-09 PROCEDURE — 84703 CHORIONIC GONADOTROPIN ASSAY: CPT

## 2025-03-09 RX ADMIN — CEFAZOLIN ONE MLS/HR: 330 INJECTION, POWDER, FOR SOLUTION INTRAMUSCULAR; INTRAVENOUS at 14:01

## 2025-03-09 NOTE — ED
General Adult HPI





- General


Chief complaint: Abdominal Pain


Stated complaint: abd pain


Time Seen by Provider: 03/09/25 13:08


Source: patient


Mode of arrival: ambulatory


Limitations: no limitations





- History of Present Illness


Initial comments: 


Patient is a pleasant 34-year-old female presenting today for abdominal 

cramping.  Patient states that she had a prior gastric bypass in October done by

Dr. Pinzon in [place].  On Tuesday she had a dilation performed and later 

that evening began experiencing cramping upper abdominal pain that radiated arou

nd her right side.  States the pain is intermittent.  She is taking Gas-X 

without much relief.  Has had associated increased amount of belching.  Denies 

nausea, vomiting, diarrhea, melena, hematochezia, constipation.  Last bowel 

movement was this morning, was soft and normal for her.  Denies fevers or 

chills, numbness tingling or weakness.  Denies dysuria or hematuria.  Denies 

vaginal bleeding or discharge.  Denies chance of pregnancy states she has had a 

prior tubal ligation and endometrial ablation.  Patient called her surgeon who 

is concerned she may have perforated bowel so directed her to emergency 

department.








- Related Data


                                Home Medications











 Medication  Instructions  Recorded  Confirmed


 


Labetalol [Trandate] 200 mg PO TID 05/19/22 08/03/22


 


Prenatal Vit No.179/Iron/Folic 1 tab PO DAILY 07/01/22 08/03/22





[Prenatal Tablet]   


 


Famotidine [Pepcid] 20 mg PO BID 07/07/22 08/03/22








                                  Previous Rx's











 Medication  Instructions  Recorded


 


Ibuprofen [Motrin] 600 mg PO Q6H #60 tab 08/05/22


 


Azithromycin [Zithromax] 500 mg PO DAILY #5 tab 12/07/22











                                    Allergies











Allergy/AdvReac Type Severity Reaction Status Date / Time


 


amoxicillin Allergy  Anaphylaxis Verified 12/07/22 06:51


 


codeine Allergy  Unknown Verified 12/07/22 06:51


 


hydromorphone [From Dilaudid] Allergy  Chest Pain Verified 12/07/22 06:51


 


nitrofurantoin Allergy  Anaphylaxis Verified 12/07/22 06:51





[From Macrobid]     


 


morphine AdvReac  Unknown Verified 12/07/22 06:51














Review of Systems


ROS Statement: 


Those systems with pertinent positive or pertinent negative responses have been 

documented in the HPI.





ROS Other: All systems not noted in ROS Statement are negative.





Past Medical History


Past Medical History: Hypertension


Additional Past Medical History / Comment(s): Hernia, ectopic pregnancy,


History of Any Multi-Drug Resistant Organisms: None Reported


Past Surgical History: Adenoidectomy, Bariatric Surgery, Cholecystectomy, 

Orthopedic Surgery, Tonsillectomy


Additional Past Surgical History / Comment(s): Laparoscopic L. salpingectomy due

to ectopic, gastric bypass 10/15/24 and dilation surgery


Past Anesthesia/Blood Transfusion Reactions: No Reported Reaction


Past Psychological History: No Psychological Hx Reported


Smoking Status: Never smoker


Past Alcohol Use History: None Reported


Past Drug Use History: None Reported





- Past Family History


  ** Mother


Family Medical History: No Reported History





  ** Father


Family Medical History: No Reported History





General Exam





- General Exam Comments


Initial Comments: 





PE:


CONSTITUTIONAL: No apparent distress, well appearing


SKIN: Warm, dry, no jaundice, hives or petechiae


EYES: Pupils are equally round, extraocular movements intact without nystagmus, 

clear conjunctiva, non-icteric sclera


HENT: Normocephalic, atraumatic, moist mucus membranes, oropharynx clear without

exudates


NECK: , Full range of motion, normal appearance


PULMONARY: Clear to auscultation without wheezes, rhonchi, or rales, normal 

excursion, no accessory muscle use and no stridor


CARDIOVASCULAR: Regular rate, rhythm, normal S1 and S2. No appreciated murmurs, 

rubs or gallops. Strong radial pulses with intact distal perfusion. No lower 

extremity edema


GASTROINTESTINAL: Soft, active bowel sounds throughout, non-tender, non-

distended, no palpable masses, no rebound or guarding. No hepatosplenomegaly


GENITOURINARY: No CVA tenderness to palpation


MUSCULOSKELETAL: Extremities  have no gross deformity, no edema, redness, or 

swelling. No calf swelling


NEUROLOGIC:_a/o x 3, GCS 15, normal mentation and speech. Moves all extremities 

x 4 without motor or sensory deficit


PSYCHIATRIC:_normal mood and affect, thought process is clear and linear





Limitations: no limitations





Course


                                   Vital Signs











  03/09/25 03/09/25 03/09/25





  13:04 14:01 15:49


 


Temperature 97.8 F  98.2 F


 


Pulse Rate 71 76 78


 


Respiratory 18 17 16





Rate   


 


Blood Pressure 139/87  141/92


 


O2 Sat by Pulse 100 100 96





Oximetry   














  03/09/25





  17:14


 


Temperature 


 


Pulse Rate 


 


Respiratory 8 L





Rate 


 


Blood Pressure 


 


O2 Sat by Pulse 





Oximetry 














EKG Findings





- EKG Comments:


EKG Findings:: Sinus rhythm rate 62 bpm VA interval 166 ms QT/QTc 436/442 ms, 

left axis deviation, T wave inversion lead V1 and lead III, no significant ST 

elevations or depressions, low voltage EKG





Medical Decision Making





- Medical Decision Making


Was pt. sent in by a medical professional or institution (, PA, NP, urgent 

care, hospital, or nursing home...) When possible be specific


@Patient was sent in by her surgeon, Dr. Pinzon


Did you speak to anyone other than the patient for history (EMS, parent, family,

police, friend...)? What history was obtained from this source 


@ -No


Did you review nursing and triage notes (agree or disagree)? Why? 


@ -I reviewed nursing and triage notes


Were old charts reviewed (outside hosp., previous admission, EMS record, old EK

G, old radiological studies, urgent care reports/EKG's, nursing home records)? 

Report findings 


@ -Medical records reviewed patient last in the emergency department on 

12/7/2022 for right ear pain and congestion


Differential Diagnosis (chest pain, altered mental status, abdominal pain women,

abdominal pain men, vaginal bleeding, weakness, fever, dyspnea, syncope, 

headache, dizziness, GI bleed, back pain, seizure, CVA, palpatations, mental 

health, musculoskeletal)? 


Differential Abdominal Pain Women:


Appendicitis, Cholecystitis, diverticulosis, ischemic bowel, pancreatitis, 

hepatitis, UTI, gastroenteritis,incarcerated hernia, bowel obstruction, 

constipation, inflammatory bowel, peptic ulcer disease, perforated viscus,  

kidney stone,  this is not meant to be an all-inclusive list


EKG interpreted by me (3pts min.).


@ -As above


X-rays interpreted by me (1pt min.).


@ -None done


CT interpreted by me (1pt min.).


I personally reviewed CT abdomen/pelvis, I see no evidence of free air or free 

fluid, no evidence of obstruction, I agree w/ radiologist interpretation


U/S interpreted by me (1pt. min.).


@ -None done


What testing was considered but not performed or refused? (CT, X-rays, U/S, 

labs)? Why?


@ -None


What meds were considered but not given or refused? Why?


@ GI cocktail was considered however pt politely declined medications, stating 

asymptomatic at this time


Did you discuss the management of the patient with other professionals 

(professionals i.e. , PA, NP, lab, RT, psych nurse, , , 

teacher, , )? Give summary


@Case was discussed w/ Dr. Pinzon, pt's surgeon, updated him to CT findings and 

labs, is comfortably with pt following up tomorrow in his office


Was smoking cessation discussed for >3mins.?


@ -No


Was critical care preformed (if so, how long)?


@ -No


Were there social determinants of health that impacted care today? How? (

Homelessness, low income, unemployed, alcoholism, drug addiction, 

transportation, low edu. Level, literacy, decrease access to med. care, prison, 

rehab)?


@ -No


Was there de-escalation of care discussed even if they declined (Discuss DNR or 

withdrawal of care, Hospice)? 


@ -No


What co-morbidities impacted this encounter? (DM, HTN, Smoking, COPD, CAD, 

Cancer, CVA, ARF, Chemo, Hep., AIDS, mental health diagnosis, sleep apnea, 

morbid obesity)?


Prior gastric bypass


Was patient admitted / discharged? Hospital course, mention meds given and 

route, prescriptions, significant lab abnormalities, going to OR and other 

pertinent info.


@Discharged-this is a pleasant 34-year-old female past medical history of prior 

gastric bypass in October 2024, recent "dilation" this past Tuesday presenting 

today for cramping abdominal pain that is intermittent.  Vital signs within 

acceptable limits on arrival, patient afebrile is not tachycardic.  She is well-

appearing in no acute distress.  Abdomen is soft and nontender with active bowel

sounds.  Nondistended.  There was limited by body habitus. Plan for CT abdomen 

pelvis, urinalysis, labs, IV fluids.  I offered pain control however she 

politely declined stating she is not currently in pain. Additionally, given 

epigastric component of pain, atypical ACS as considered, though much lower on 

differential diagnosis as pt denies chest pain or DB,  given pt hx, exam and 

lack of risk factors, will obtain EKG and single troponin as pain ongoing for ~ 

5 days and would expect elevation at this point if 2/2 ACS. 





Labs reviewed. Grossly within normal limits. Abnormal values not concerning for 

acute pathology related to presenting complaint.  Troponin was undetectable.  

Urinalysis did show large leukocyte esterase but negative nitrites, 7 white 

blood cells, 22 squamous cells rare bacteria, patient denies any symptoms of a 

UTI at this time suspect contaminated source.  CT abdomen pelvis showed no acute

process or evidence of perforation.  Updated patient to findings.  Case was 

discussed with Dr. Marie, patient surgeon, see rec's above.  Patient is comfor

table with plan for discharge and follow-up with her surgeon tomorrow.





In my medical judgment there is currently no evidence of an immediate life-

threatening or surgical condition.  Discharge is therefore indicated at this 

time.  





Discharge treatment instructions, follow up instructions, and appropriate 

emergency department return precautions were discussed with the patient and/or 

medical decision maker. Patient and/or medical decision maker expressed 

understanding of and agreed with the treatment plan, follow up instructions, and

emergency department return precaution. All patient's and/or medical decision 

maker's questions were answered.





The patient was instructed to return to the ED for any changes in symptoms, 

persistent symptoms, inability to obtain proper follow-up or for any further 

concerns.  Patient received verbal and written instructions for this condition.


Undiagnosed new problem with uncertain prognosis?


@ -No


Drug Therapy requiring intensive monitoring for toxicity (Heparin, Nitro, 

Insulin, Cardizem)?


@ -No


Were any procedures done?


@ -No


Diagnosis/symptom?


@Abdominal pain


Acute, or Chronic, or Acute on Chronic?


@Acute


Uncomplicated (without systemic symptoms) or Complicated (systemic symptoms)?


uncomplicated


Side effects of treatment?


@ -No


Exacerbation, Progression, or Severe Exacerbation?


@ -No


Poses a threat to life or bodily function? How? (Chest pain, USA, MI, pneumonia,

PE, COPD, DKA, ARF, appy, cholecystitis, CVA, Diverticulitis, Homicidal, 

Suicidal, threat to staff... and all critical care pts)


@ -No








- Lab Data


Result diagrams: 


                                 03/09/25 14:00





                                 03/09/25 14:00


                                   Lab Results











  03/09/25 03/09/25 03/09/25 Range/Units





  14:00 14:00 14:00 


 


WBC  8.0    (3.8-10.6)  k/uL


 


RBC  5.00    (3.80-5.40)  m/uL


 


Hgb  12.8    (11.4-16.0)  gm/dL


 


Hct  40.5    (34.0-46.0)  %


 


MCV  81.0    (80.0-100.0)  fL


 


MCH  25.5    (25.0-35.0)  pg


 


MCHC  31.5    (31.0-37.0)  g/dL


 


RDW  15.5    (11.5-15.5)  %


 


Plt Count  294    (150-450)  k/uL


 


MPV  7.6    


 


Neutrophils %  57    %


 


Lymphocytes %  33    %


 


Monocytes %  5    %


 


Eosinophils %  2    %


 


Basophils %  1    %


 


Neutrophils #  4.6    (1.3-7.7)  k/uL


 


Lymphocytes #  2.7    (1.0-4.8)  k/uL


 


Monocytes #  0.4    (0-1.0)  k/uL


 


Eosinophils #  0.2    (0-0.7)  k/uL


 


Basophils #  0.0    (0-0.2)  k/uL


 


PT    11.2  (10.0-12.5)  sec


 


INR    1.0  (<1.2)  


 


APTT    26.8  (22.0-30.0)  sec


 


Sodium   138   (137-145)  mmol/L


 


Potassium   3.8   (3.5-5.1)  mmol/L


 


Chloride   102   ()  mmol/L


 


Carbon Dioxide   29   (22-30)  mmol/L


 


Anion Gap   7   mmol/L


 


BUN   7   (7-17)  mg/dL


 


Creatinine   0.58   (0.52-1.04)  mg/dL


 


Est GFR (CKD-EPI)AfAm   >90   (>60 ml/min/1.73 sqM)  


 


Est GFR (CKD-EPI)NonAf   >90   (>60 ml/min/1.73 sqM)  


 


Glucose   81   (74-99)  mg/dL


 


Plasma Lactic Acid Alvarez     (0.7-2.0)  mmol/L


 


Calcium   9.3   (8.4-10.2)  mg/dL


 


Total Bilirubin   0.6   (0.2-1.3)  mg/dL


 


AST   29   (14-36)  U/L


 


ALT   24   (4-34)  U/L


 


Alkaline Phosphatase   57   ()  U/L


 


Troponin I     (0.000-0.034)  ng/mL


 


Total Protein   6.4   (6.3-8.2)  g/dL


 


Albumin   3.8   (3.5-5.0)  g/dL


 


Amylase   41   ()  U/L


 


Lipase   90   ()  U/L


 


HCG, Qual   Not Detected   


 


Urine Color     


 


Urine Appearance     (Clear)  


 


Urine pH     (5.0-8.0)  


 


Ur Specific Gravity     (1.001-1.035)  


 


Urine Protein     (Negative)  


 


Urine Glucose (UA)     (Negative)  


 


Urine Ketones     (Negative)  


 


Urine Blood     (Negative)  


 


Urine Nitrite     (Negative)  


 


Urine Bilirubin     (Negative)  


 


Urine Urobilinogen     (<2.0)  mg/dL


 


Ur Leukocyte Esterase     (Negative)  


 


Urine RBC     (0-5)  /hpf


 


Urine WBC     (0-5)  /hpf


 


Ur Squamous Epith Cells     (0-4)  /hpf


 


Urine Bacteria     (None)  /hpf


 


Urine Mucus     (None)  /hpf














  03/09/25 03/09/25 03/09/25 Range/Units





  14:00 14:00 14:00 


 


WBC     (3.8-10.6)  k/uL


 


RBC     (3.80-5.40)  m/uL


 


Hgb     (11.4-16.0)  gm/dL


 


Hct     (34.0-46.0)  %


 


MCV     (80.0-100.0)  fL


 


MCH     (25.0-35.0)  pg


 


MCHC     (31.0-37.0)  g/dL


 


RDW     (11.5-15.5)  %


 


Plt Count     (150-450)  k/uL


 


MPV     


 


Neutrophils %     %


 


Lymphocytes %     %


 


Monocytes %     %


 


Eosinophils %     %


 


Basophils %     %


 


Neutrophils #     (1.3-7.7)  k/uL


 


Lymphocytes #     (1.0-4.8)  k/uL


 


Monocytes #     (0-1.0)  k/uL


 


Eosinophils #     (0-0.7)  k/uL


 


Basophils #     (0-0.2)  k/uL


 


PT     (10.0-12.5)  sec


 


INR     (<1.2)  


 


APTT     (22.0-30.0)  sec


 


Sodium     (137-145)  mmol/L


 


Potassium     (3.5-5.1)  mmol/L


 


Chloride     ()  mmol/L


 


Carbon Dioxide     (22-30)  mmol/L


 


Anion Gap     mmol/L


 


BUN     (7-17)  mg/dL


 


Creatinine     (0.52-1.04)  mg/dL


 


Est GFR (CKD-EPI)AfAm     (>60 ml/min/1.73 sqM)  


 


Est GFR (CKD-EPI)NonAf     (>60 ml/min/1.73 sqM)  


 


Glucose     (74-99)  mg/dL


 


Plasma Lactic Acid Alvarez   1.0   (0.7-2.0)  mmol/L


 


Calcium     (8.4-10.2)  mg/dL


 


Total Bilirubin     (0.2-1.3)  mg/dL


 


AST     (14-36)  U/L


 


ALT     (4-34)  U/L


 


Alkaline Phosphatase     ()  U/L


 


Troponin I    <0.012  (0.000-0.034)  ng/mL


 


Total Protein     (6.3-8.2)  g/dL


 


Albumin     (3.5-5.0)  g/dL


 


Amylase     ()  U/L


 


Lipase     ()  U/L


 


HCG, Qual     


 


Urine Color  Yellow    


 


Urine Appearance  Cloudy H    (Clear)  


 


Urine pH  6.0    (5.0-8.0)  


 


Ur Specific Gravity  1.032    (1.001-1.035)  


 


Urine Protein  Trace H    (Negative)  


 


Urine Glucose (UA)  Negative    (Negative)  


 


Urine Ketones  Trace H    (Negative)  


 


Urine Blood  Negative    (Negative)  


 


Urine Nitrite  Negative    (Negative)  


 


Urine Bilirubin  Negative    (Negative)  


 


Urine Urobilinogen  4.0    (<2.0)  mg/dL


 


Ur Leukocyte Esterase  Large H    (Negative)  


 


Urine RBC  3    (0-5)  /hpf


 


Urine WBC  7 H    (0-5)  /hpf


 


Ur Squamous Epith Cells  22 H    (0-4)  /hpf


 


Urine Bacteria  Rare H    (None)  /hpf


 


Urine Mucus  Many H    (None)  /hpf














Disposition


Clinical Impression: 


 Gas pain





Disposition: HOME SELF-CARE


Condition: Stable


Instructions (If sedation given, give patient instructions):  Abdominal Pain 

(ED)


Additional Instructions: 


Every disease is a spectrum and a small chance still exists that a serious 

condition could develop, for this reason, please monitor yourself closely for 

new, changing or worsening symptoms, symptoms that do not improve in 48 hours, 

vomiting, vomiting blood, no bowel movement for greater than 72 hours, 

uncontrollable pain fever, inability to tolerate/keep down fluids or your 

medications, inability to follow up with outpatient providers as instructed and 

should you experience these symptoms or should you have any further concerns for

your wellbeing please return to the ED or call 911 immediately. 





Please maintain a clear liquid diet until you see Dr. Pinzon





Please call Dr. Pinzon's office first thing in the morning for follow up in the 

next 24 hours. 


PLEASE call your primary care physician as soon as possible to arrange / discuss

plan for followup appointment. Appointment in the next 1-3 days is strongly 

encouraged if possible. 


PLEASE let us know here before you leave if there is anything further we can do 

to be of any assistance.


Take care and feel Better!





Is patient prescribed a controlled substance at d/c from ED?: No


Referrals: 


Pierce Bhatti MD [Primary Care Provider] - 1-2 days

## 2025-03-09 NOTE — CT
EXAMINATION TYPE: CT abdomen pelvis w con

 

DATE OF EXAM: 3/9/2025 3:13 PM

 

COMPARISON: None available. 

 

CLINICAL INDICATION: Female, 34 years old with history of abdominal pain; abdominal pain

 

TECHNIQUE:  Axial CT abdomen pelvis w con;Sagittal and coronal reformats were created on a separate w
orkstation. 

Contrast used:100 ml mL of Isovue 370 with IV Contrast, (none if empty)

Oral contrast used: without Oral Contrast (none if empty)

CT DLP: 3257.9 mGycm, Automated exposure control for dose reduction was used.

 

FINDINGS: 

LOWER CHEST: Unremarkable

 

ABDOMEN

LIVER: Unremarkable

GALLBLADDER AND BILE DUCTS: The gallbladder is surgically absent.

PANCREAS: Unremarkable.

SPLEEN: Mild splenomegaly measuring 15.4 cm in craniocaudal dimension.

ADRENAL GLANDS: Unremarkable.

KIDNEYS AND URETERS: No evidence of hydronephrosis or renal calculus. The ureters are unremarkable.  


 

PELVIS

BLADDER: No evidence for wall thickening or mass given limitations of exam.

REPRODUCTIVE: Unremarkable.

 

ABDOMEN & PELVIS

STOMACH AND BOWEL: Postsurgical changes compatible with previous Jimmie-en-Y gastric bypass. Small bipin
r hernia. Appendix normal. No small bowel obstruction.

PERITONEUM/RETROPERITONEUM: No evidence of pneumoperitoneum or free fluid.

VASCULATURE: No evidence of aortic aneurysm. 

MUSCULOSKELETAL: No acute osseous abnormalities

LYMPH NODES: No gross evidence for lymphadenopathy.

SOFT TISSUE/ABDOMINAL WALL: Unremarkable

 

IMPRESSION:

No acute abnormality in the abdomen/pelvis or CT findings to explain reported symptoms.

 

[facility], Workstation: [id number], 3/9/2025 3:38 PM